# Patient Record
Sex: FEMALE | Race: BLACK OR AFRICAN AMERICAN | NOT HISPANIC OR LATINO
[De-identification: names, ages, dates, MRNs, and addresses within clinical notes are randomized per-mention and may not be internally consistent; named-entity substitution may affect disease eponyms.]

---

## 2017-03-22 ENCOUNTER — TRANSCRIPTION ENCOUNTER (OUTPATIENT)
Age: 32
End: 2017-03-22

## 2019-12-05 ENCOUNTER — APPOINTMENT (OUTPATIENT)
Dept: ANTEPARTUM | Facility: CLINIC | Age: 34
End: 2019-12-05
Payer: MEDICAID

## 2019-12-05 ENCOUNTER — ASOB RESULT (OUTPATIENT)
Age: 34
End: 2019-12-05

## 2019-12-05 PROCEDURE — 76813 OB US NUCHAL MEAS 1 GEST: CPT

## 2019-12-05 PROCEDURE — 36416 COLLJ CAPILLARY BLOOD SPEC: CPT

## 2019-12-05 PROCEDURE — 76801 OB US < 14 WKS SINGLE FETUS: CPT

## 2019-12-26 PROBLEM — Z00.00 ENCOUNTER FOR PREVENTIVE HEALTH EXAMINATION: Status: ACTIVE | Noted: 2019-12-26

## 2019-12-31 ENCOUNTER — APPOINTMENT (OUTPATIENT)
Dept: ANTEPARTUM | Facility: CLINIC | Age: 34
End: 2019-12-31
Payer: MEDICAID

## 2019-12-31 ENCOUNTER — ASOB RESULT (OUTPATIENT)
Age: 34
End: 2019-12-31

## 2019-12-31 PROCEDURE — 99213 OFFICE O/P EST LOW 20 MIN: CPT | Mod: 25

## 2019-12-31 PROCEDURE — 76815 OB US LIMITED FETUS(S): CPT

## 2020-02-03 ENCOUNTER — APPOINTMENT (OUTPATIENT)
Dept: ANTEPARTUM | Facility: CLINIC | Age: 35
End: 2020-02-03
Payer: MEDICAID

## 2020-02-03 ENCOUNTER — ASOB RESULT (OUTPATIENT)
Age: 35
End: 2020-02-03

## 2020-02-03 PROCEDURE — 76817 TRANSVAGINAL US OBSTETRIC: CPT

## 2020-02-03 PROCEDURE — 76811 OB US DETAILED SNGL FETUS: CPT

## 2020-02-11 ENCOUNTER — APPOINTMENT (OUTPATIENT)
Dept: ANTEPARTUM | Facility: CLINIC | Age: 35
End: 2020-02-11
Payer: MEDICAID

## 2020-02-11 ENCOUNTER — ASOB RESULT (OUTPATIENT)
Age: 35
End: 2020-02-11

## 2020-02-11 PROCEDURE — 76816 OB US FOLLOW-UP PER FETUS: CPT

## 2020-02-11 PROCEDURE — 76817 TRANSVAGINAL US OBSTETRIC: CPT

## 2020-02-25 ENCOUNTER — APPOINTMENT (OUTPATIENT)
Dept: ANTEPARTUM | Facility: CLINIC | Age: 35
End: 2020-02-25

## 2020-04-29 ENCOUNTER — ASOB RESULT (OUTPATIENT)
Age: 35
End: 2020-04-29

## 2020-04-29 ENCOUNTER — APPOINTMENT (OUTPATIENT)
Dept: ANTEPARTUM | Facility: CLINIC | Age: 35
End: 2020-04-29
Payer: MEDICAID

## 2020-04-29 PROCEDURE — 76819 FETAL BIOPHYS PROFIL W/O NST: CPT

## 2020-04-29 PROCEDURE — 76816 OB US FOLLOW-UP PER FETUS: CPT

## 2020-05-22 ENCOUNTER — OUTPATIENT (OUTPATIENT)
Dept: OUTPATIENT SERVICES | Facility: HOSPITAL | Age: 35
LOS: 1 days | End: 2020-05-22
Payer: MEDICAID

## 2020-05-22 VITALS
HEIGHT: 63 IN | SYSTOLIC BLOOD PRESSURE: 124 MMHG | DIASTOLIC BLOOD PRESSURE: 80 MMHG | HEART RATE: 98 BPM | OXYGEN SATURATION: 99 % | TEMPERATURE: 97 F | WEIGHT: 175.93 LBS | RESPIRATION RATE: 14 BRPM

## 2020-05-22 DIAGNOSIS — Z98.89 OTHER SPECIFIED POSTPROCEDURAL STATES: Chronic | ICD-10-CM

## 2020-05-22 DIAGNOSIS — Z34.90 ENCOUNTER FOR SUPERVISION OF NORMAL PREGNANCY, UNSPECIFIED, UNSPECIFIED TRIMESTER: ICD-10-CM

## 2020-05-22 DIAGNOSIS — N80.9 ENDOMETRIOSIS, UNSPECIFIED: Chronic | ICD-10-CM

## 2020-05-22 LAB
ANTIBODY ID 1_1: SIGNIFICANT CHANGE UP
ANTIBODY ID 1_2: SIGNIFICANT CHANGE UP
ANTIBODY ID 1_3: SIGNIFICANT CHANGE UP
APPEARANCE UR: SIGNIFICANT CHANGE UP
BACTERIA # UR AUTO: SIGNIFICANT CHANGE UP
BILIRUB UR-MCNC: NEGATIVE — SIGNIFICANT CHANGE UP
BLD GP AB SCN SERPL QL: POSITIVE — SIGNIFICANT CHANGE UP
BLOOD UR QL VISUAL: NEGATIVE — SIGNIFICANT CHANGE UP
COLOR SPEC: YELLOW — SIGNIFICANT CHANGE UP
DAT POLY-SP REAG RBC QL: NEGATIVE — SIGNIFICANT CHANGE UP
GLUCOSE UR-MCNC: NEGATIVE — SIGNIFICANT CHANGE UP
HCT VFR BLD CALC: 29.9 % — LOW (ref 34.5–45)
HGB BLD-MCNC: 9.7 G/DL — LOW (ref 11.5–15.5)
HYALINE CASTS # UR AUTO: NEGATIVE — SIGNIFICANT CHANGE UP
KETONES UR-MCNC: NEGATIVE — SIGNIFICANT CHANGE UP
LEUKOCYTE ESTERASE UR-ACNC: SIGNIFICANT CHANGE UP
MCHC RBC-ENTMCNC: 28.6 PG — SIGNIFICANT CHANGE UP (ref 27–34)
MCHC RBC-ENTMCNC: 32.4 % — SIGNIFICANT CHANGE UP (ref 32–36)
MCV RBC AUTO: 88.2 FL — SIGNIFICANT CHANGE UP (ref 80–100)
MUCOUS THREADS # UR AUTO: SIGNIFICANT CHANGE UP
NITRITE UR-MCNC: NEGATIVE — SIGNIFICANT CHANGE UP
NRBC # FLD: 0 K/UL — SIGNIFICANT CHANGE UP (ref 0–0)
PH UR: 6 — SIGNIFICANT CHANGE UP (ref 5–8)
PLATELET # BLD AUTO: 272 K/UL — SIGNIFICANT CHANGE UP (ref 150–400)
PMV BLD: 9.8 FL — SIGNIFICANT CHANGE UP (ref 7–13)
PROT UR-MCNC: 50 — SIGNIFICANT CHANGE UP
RBC # BLD: 3.39 M/UL — LOW (ref 3.8–5.2)
RBC # FLD: 13.9 % — SIGNIFICANT CHANGE UP (ref 10.3–14.5)
RBC CASTS # UR COMP ASSIST: SIGNIFICANT CHANGE UP (ref 0–?)
RH IG SCN BLD-IMP: POSITIVE — SIGNIFICANT CHANGE UP
SP GR SPEC: 1.03 — SIGNIFICANT CHANGE UP (ref 1–1.04)
SQUAMOUS # UR AUTO: SIGNIFICANT CHANGE UP
UROBILINOGEN FLD QL: SIGNIFICANT CHANGE UP
WBC # BLD: 6.91 K/UL — SIGNIFICANT CHANGE UP (ref 3.8–10.5)
WBC # FLD AUTO: 6.91 K/UL — SIGNIFICANT CHANGE UP (ref 3.8–10.5)
WBC UR QL: SIGNIFICANT CHANGE UP (ref 0–?)

## 2020-05-22 RX ORDER — SODIUM CHLORIDE 9 MG/ML
1000 INJECTION, SOLUTION INTRAVENOUS
Refills: 0 | Status: DISCONTINUED | OUTPATIENT
Start: 2020-05-28 | End: 2020-05-28

## 2020-05-22 NOTE — OB PST NOTE - PROBLEM SELECTOR PLAN 1
Pt scheduled for repeat  section.  labs done results pending.  Hibiclens provided:  verbal and written instructions given with teach back, pt able to verbalize  understanding.  Preop teaching done, pt able to verbalize understanding.

## 2020-05-22 NOTE — OB PST NOTE - NSHPREVIEWOFSYSTEMS_GEN_ALL_CORE
General: No fever, chills, sweating, anorexia, weight loss or weight gain. No polyphagia, polyurea, polydypsia, malaise, or fatigue    Skin: No rashes, itching, or dryness. No change in size/color of moles. No tumors, brittle nails, pitted nails, or hair loss    Breast: No tenderness, lumps, or nipple discharge      Ophthalmologic: No diplopia, photophobia, lacrimation, blurred Vision , or eye discharge    ENMT Symptoms: No hearing difficulty, ear pain, tinnitus, or vertigo. No sinus symptoms, nasal congestion, nasal   discharge, or nasal obstruction    Respiratory and Thorax: No wheezing, dyspnea, cough, hemoptysis, or pleuritic chest pPain     Cardiovascular: h/o pre eclampsia with first baby 2006 was placed on asa, stopped on own one month ago.  No chest pain, palpitations, dyspnea on exertion, orthopnea, paroxysmal nocturnal dyspnea,   peripheral edema, or claudication    Gastrointestinal: No nausea, vomiting, diarrhea, constipation, change in bowel habits, flatulence, abdominal pain, or melena    Genitourinary/ Pelvis: No hematuria, renal colic, or flank pain.  No urine discoloration, incontinence, dysuria, or urinary hesitancy. Normal urinary frequency. No nocturia, abnormal vaginal bleeding, vaginal discharge, spotting, pelvic pain, or vaginal leakage    Musculoskeletal: No arthralgia, arthritis, joint swelling, muscle cramping, muscle weakness, neck pain, arm pain, or leg pain    Neurological: No transient paralysis, weakness, paresthesias, or seizures. No syncope, tremors, vertigo, loss of sensation, difficulty walking, loss of consciousness, hemiparesis, confusion, or facial palsy    Psychiatric: No suicidal ideation, depression, anxiety, insomnia, memory loss, paranoia, mood swings, agitation, hallucinations, or hyperactivity    Hematology: No gum bleeding, nose bleeding, or skin lumps    Lymphatic: No enlarged or tender lymph nodes. No extremity swelling    Endocrine: No heat or cold intolerance    Immunologic: No recurrent or persistent infections

## 2020-05-23 LAB — T PALLIDUM AB TITR SER: NEGATIVE — SIGNIFICANT CHANGE UP

## 2020-05-23 PROCEDURE — 86077 PHYS BLOOD BANK SERV XMATCH: CPT

## 2020-05-26 DIAGNOSIS — Z01.818 ENCOUNTER FOR OTHER PREPROCEDURAL EXAMINATION: ICD-10-CM

## 2020-05-27 ENCOUNTER — TRANSCRIPTION ENCOUNTER (OUTPATIENT)
Age: 35
End: 2020-05-27

## 2020-05-27 ENCOUNTER — APPOINTMENT (OUTPATIENT)
Dept: DISASTER EMERGENCY | Facility: CLINIC | Age: 35
End: 2020-05-27

## 2020-05-27 RX ORDER — FAMOTIDINE 10 MG/ML
20 INJECTION INTRAVENOUS ONCE
Refills: 0 | Status: DISCONTINUED | OUTPATIENT
Start: 2020-05-28 | End: 2020-05-31

## 2020-05-28 ENCOUNTER — INPATIENT (INPATIENT)
Facility: HOSPITAL | Age: 35
LOS: 2 days | Discharge: HOME | End: 2020-05-31
Attending: OBSTETRICS & GYNECOLOGY | Admitting: OBSTETRICS & GYNECOLOGY
Payer: MEDICAID

## 2020-05-28 ENCOUNTER — RESULT REVIEW (OUTPATIENT)
Age: 35
End: 2020-05-28

## 2020-05-28 VITALS
RESPIRATION RATE: 9 BRPM | SYSTOLIC BLOOD PRESSURE: 122 MMHG | HEART RATE: 93 BPM | DIASTOLIC BLOOD PRESSURE: 95 MMHG | OXYGEN SATURATION: 97 %

## 2020-05-28 DIAGNOSIS — N80.9 ENDOMETRIOSIS, UNSPECIFIED: Chronic | ICD-10-CM

## 2020-05-28 DIAGNOSIS — Z98.89 OTHER SPECIFIED POSTPROCEDURAL STATES: Chronic | ICD-10-CM

## 2020-05-28 LAB
ANTIBODY ID 1_1: SIGNIFICANT CHANGE UP
ANTIBODY ID 1_2: SIGNIFICANT CHANGE UP
ANTIBODY ID 1_3: SIGNIFICANT CHANGE UP
BLD GP AB SCN SERPL QL: POSITIVE — SIGNIFICANT CHANGE UP
DAT POLY-SP REAG RBC QL: NEGATIVE — SIGNIFICANT CHANGE UP
HCT VFR BLD CALC: 30.7 % — LOW (ref 34.5–45)
HGB BLD-MCNC: 9.8 G/DL — LOW (ref 11.5–15.5)
MCHC RBC-ENTMCNC: 28.2 PG — SIGNIFICANT CHANGE UP (ref 27–34)
MCHC RBC-ENTMCNC: 31.9 % — LOW (ref 32–36)
MCV RBC AUTO: 88.5 FL — SIGNIFICANT CHANGE UP (ref 80–100)
NRBC # FLD: 0 K/UL — SIGNIFICANT CHANGE UP (ref 0–0)
PLATELET # BLD AUTO: 279 K/UL — SIGNIFICANT CHANGE UP (ref 150–400)
PMV BLD: 9.6 FL — SIGNIFICANT CHANGE UP (ref 7–13)
RBC # BLD: 3.47 M/UL — LOW (ref 3.8–5.2)
RBC # FLD: 13.8 % — SIGNIFICANT CHANGE UP (ref 10.3–14.5)
RH IG SCN BLD-IMP: POSITIVE — SIGNIFICANT CHANGE UP
WBC # BLD: 7.08 K/UL — SIGNIFICANT CHANGE UP (ref 3.8–10.5)
WBC # FLD AUTO: 7.08 K/UL — SIGNIFICANT CHANGE UP (ref 3.8–10.5)

## 2020-05-28 PROCEDURE — 88307 TISSUE EXAM BY PATHOLOGIST: CPT | Mod: 26

## 2020-05-28 PROCEDURE — 44125 REMOVAL OF SMALL INTESTINE: CPT

## 2020-05-28 PROCEDURE — 86077 PHYS BLOOD BANK SERV XMATCH: CPT

## 2020-05-28 RX ORDER — BUTORPHANOL TARTRATE 2 MG/ML
0.12 INJECTION, SOLUTION INTRAMUSCULAR; INTRAVENOUS EVERY 6 HOURS
Refills: 0 | Status: DISCONTINUED | OUTPATIENT
Start: 2020-05-28 | End: 2020-05-30

## 2020-05-28 RX ORDER — CITRIC ACID/SODIUM CITRATE 300-500 MG
30 SOLUTION, ORAL ORAL ONCE
Refills: 0 | Status: COMPLETED | OUTPATIENT
Start: 2020-05-28 | End: 2020-05-28

## 2020-05-28 RX ORDER — HEPARIN SODIUM 5000 [USP'U]/ML
5000 INJECTION INTRAVENOUS; SUBCUTANEOUS EVERY 12 HOURS
Refills: 0 | Status: DISCONTINUED | OUTPATIENT
Start: 2020-05-28 | End: 2020-05-31

## 2020-05-28 RX ORDER — ONDANSETRON 8 MG/1
4 TABLET, FILM COATED ORAL EVERY 6 HOURS
Refills: 0 | Status: DISCONTINUED | OUTPATIENT
Start: 2020-05-28 | End: 2020-05-30

## 2020-05-28 RX ORDER — SODIUM CHLORIDE 9 MG/ML
1000 INJECTION, SOLUTION INTRAVENOUS
Refills: 0 | Status: DISCONTINUED | OUTPATIENT
Start: 2020-05-28 | End: 2020-05-28

## 2020-05-28 RX ORDER — OXYCODONE HYDROCHLORIDE 5 MG/1
5 TABLET ORAL ONCE
Refills: 0 | Status: DISCONTINUED | OUTPATIENT
Start: 2020-05-28 | End: 2020-05-31

## 2020-05-28 RX ORDER — TETANUS TOXOID, REDUCED DIPHTHERIA TOXOID AND ACELLULAR PERTUSSIS VACCINE, ADSORBED 5; 2.5; 8; 8; 2.5 [IU]/.5ML; [IU]/.5ML; UG/.5ML; UG/.5ML; UG/.5ML
0.5 SUSPENSION INTRAMUSCULAR ONCE
Refills: 0 | Status: DISCONTINUED | OUTPATIENT
Start: 2020-05-28 | End: 2020-05-31

## 2020-05-28 RX ORDER — ONDANSETRON 8 MG/1
4 TABLET, FILM COATED ORAL ONCE
Refills: 0 | Status: DISCONTINUED | OUTPATIENT
Start: 2020-05-28 | End: 2020-05-28

## 2020-05-28 RX ORDER — DIPHENHYDRAMINE HCL 50 MG
25 CAPSULE ORAL EVERY 6 HOURS
Refills: 0 | Status: DISCONTINUED | OUTPATIENT
Start: 2020-05-28 | End: 2020-05-31

## 2020-05-28 RX ORDER — CEFOTETAN DISODIUM 1 G
2 VIAL (EA) INJECTION ONCE
Refills: 0 | Status: DISCONTINUED | OUTPATIENT
Start: 2020-05-28 | End: 2020-05-29

## 2020-05-28 RX ORDER — OXYCODONE HYDROCHLORIDE 5 MG/1
5 TABLET ORAL
Refills: 0 | Status: COMPLETED | OUTPATIENT
Start: 2020-05-28 | End: 2020-06-04

## 2020-05-28 RX ORDER — INFLUENZA VIRUS VACCINE 15; 15; 15; 15 UG/.5ML; UG/.5ML; UG/.5ML; UG/.5ML
0.5 SUSPENSION INTRAMUSCULAR ONCE
Refills: 0 | Status: COMPLETED | OUTPATIENT
Start: 2020-05-28 | End: 2020-05-28

## 2020-05-28 RX ORDER — NALOXONE HYDROCHLORIDE 4 MG/.1ML
0.1 SPRAY NASAL
Refills: 0 | Status: DISCONTINUED | OUTPATIENT
Start: 2020-05-28 | End: 2020-05-30

## 2020-05-28 RX ORDER — ACETAMINOPHEN 500 MG
975 TABLET ORAL
Refills: 0 | Status: DISCONTINUED | OUTPATIENT
Start: 2020-05-28 | End: 2020-05-31

## 2020-05-28 RX ORDER — SIMETHICONE 80 MG/1
80 TABLET, CHEWABLE ORAL EVERY 4 HOURS
Refills: 0 | Status: DISCONTINUED | OUTPATIENT
Start: 2020-05-28 | End: 2020-05-31

## 2020-05-28 RX ORDER — MAGNESIUM HYDROXIDE 400 MG/1
30 TABLET, CHEWABLE ORAL
Refills: 0 | Status: DISCONTINUED | OUTPATIENT
Start: 2020-05-28 | End: 2020-05-31

## 2020-05-28 RX ORDER — METOCLOPRAMIDE HCL 10 MG
10 TABLET ORAL ONCE
Refills: 0 | Status: COMPLETED | OUTPATIENT
Start: 2020-05-28 | End: 2020-05-28

## 2020-05-28 RX ORDER — LANOLIN
1 OINTMENT (GRAM) TOPICAL EVERY 6 HOURS
Refills: 0 | Status: DISCONTINUED | OUTPATIENT
Start: 2020-05-28 | End: 2020-05-31

## 2020-05-28 RX ORDER — OXYTOCIN 10 UNIT/ML
333.33 VIAL (ML) INJECTION
Qty: 20 | Refills: 0 | Status: DISCONTINUED | OUTPATIENT
Start: 2020-05-28 | End: 2020-05-28

## 2020-05-28 RX ORDER — SODIUM CHLORIDE 9 MG/ML
1000 INJECTION, SOLUTION INTRAVENOUS ONCE
Refills: 0 | Status: COMPLETED | OUTPATIENT
Start: 2020-05-28 | End: 2020-05-28

## 2020-05-28 RX ADMIN — Medication 30 MILLILITER(S): at 10:58

## 2020-05-28 RX ADMIN — HEPARIN SODIUM 5000 UNIT(S): 5000 INJECTION INTRAVENOUS; SUBCUTANEOUS at 22:00

## 2020-05-28 RX ADMIN — Medication 10 MILLIGRAM(S): at 11:54

## 2020-05-28 RX ADMIN — SODIUM CHLORIDE 2000 MILLILITER(S): 9 INJECTION, SOLUTION INTRAVENOUS at 10:59

## 2020-05-28 NOTE — BRIEF OPERATIVE NOTE - NSICDXBRIEFPROCEDURE_GEN_ALL_CORE_FT
PROCEDURES:  Single enterectomy 29-May-2020 06:59:58  Rigoberto Maddox
PROCEDURES:  Colectomy, ascending colon 28-May-2020 18:38:38 partial with side-to-side reanastomosis Kasie Lees  Repeat  section 28-May-2020 18:31:26  Kasie Lees

## 2020-05-28 NOTE — BRIEF OPERATIVE NOTE - NSICDXBRIEFPREOP_GEN_ALL_CORE_FT
PRE-OP DIAGNOSIS:  History of classical  section 28-May-2020 18:32:33  Kasie Lees
PRE-OP DIAGNOSIS:  History of classical  section 28-May-2020 18:32:33  Kasie Lees

## 2020-05-28 NOTE — BRIEF OPERATIVE NOTE - OPERATION/FINDINGS
Dense pelvic adhesions with some evidence of deserosalation. Primary repair narrowed the lumen >50%, resected a ~7cm single section of ileum (20-30cm from IC valve) and did a side to side functional end to end stapled anastomosis.
Dense midline adhesions involving anterior abdominal wall, rectus muscle, and anterior uterus with an adhered loop of ascending colon, just distal to cecum.  Bowel fibrotic after resection.  Adnexa not visualized 2/2 adhesions.    Female infant, cephalic presentation, APGARs 9, 9, 3260g

## 2020-05-28 NOTE — OB NEONATOLOGY/PEDIATRICIAN DELIVERY SUMMARY - NSPEDSNEONOTESA_OBGYN_ALL_OB_FT
37 abd 0 week F born via repeat C/S to a [id number] mother. Prior 28 week C/S with classical incision. Mother positive for anti-PETER antibodies. NRNL. B+, GBS neg 5/6, PNL neg/I, RPR sent and pending. BF, hep  B, Trang. 9,9. NBN.

## 2020-05-28 NOTE — OB RN INTRAOPERATIVE NOTE - NS_DRESSSECURWITH_OBGYN_ALL_OB
How Severe Is Your Skin Lesion?: mild Have Your Skin Lesions Been Treated?: not been treated Is This A New Presentation, Or A Follow-Up?: Skin Lesions Transpore Tape

## 2020-05-28 NOTE — OB PROVIDER DELIVERY SUMMARY - NSPROVIDERDELIVERYNOTE_OBGYN_ALL_OB_FT
Dense midline adhesions involving anterior abdominal wall, rectus muscle, and anterior uterus with an adhered loop of ascending colon, just distal to cecum.  Bowel fibrotic after resection.  Adnexa not visualized 2/2 adhesions.    Female infant, cephalic presentation, APGARs 9, 9, 3260g

## 2020-05-28 NOTE — CHART NOTE - NSCHARTNOTEFT_GEN_A_CORE
POST-OPERATIVE NOTE    Subjective:  Patient is s/p  section with intra-op consult for lysis of adhesions and SBR of distal ilium with primary stapled anastomosis. Recovering appropriately.     Vital Signs Last 24 Hrs  T(C): 36.9 (28 May 2020 18:04), Max: 36.9 (28 May 2020 18:04)  T(F): 98.4 (28 May 2020 18:04), Max: 98.4 (28 May 2020 18:04)  HR: 73 (28 May 2020 19:30) (73 - 93)  BP: 119/68 (28 May 2020 19:30) (117/90 - 141/104)  BP(mean): 81 (28 May 2020 19:30) (81 - 113)  RR: 14 (28 May 2020 19:30) (9 - 21)  SpO2: 98% (28 May 2020 19:30) (95% - 100%)  I&O's Detail    28 May 2020 07:01  -  28 May 2020 20:30  --------------------------------------------------------  IN:    lactated ringers.: 260 mL    Other: 4000 mL    oxytocin Infusion: 437 mL  Total IN: 4697 mL    OUT:    Estimated Blood Loss: 772 mL    Indwelling Catheter - Urethral: 820 mL  Total OUT: 1592 mL    Total NET: 3105 mL        cefoTEtan  IVPB 2  cefoTEtan  IVPB 2  heparin   Injectable 5000    PAST MEDICAL & SURGICAL HISTORY:  Pregnancy  Seasonal allergies  Endometriosis determined by laparoscopy: 3/15/2015  H/O  section: 2016        Physical Exam:  General: NAD, resting comfortably in bed  Pulmonary: Nonlabored breathing, no respiratory distress  Cardiovascular: NSR, no murmurs or rubs  Abdominal: soft, appropriately tender, nondistended. Pfannensteil incision with dressing in place, c/d/i  Extremities: [facility]      LABS:                        9.8    7.08  )-----------( 279      ( 28 May 2020 09:53 )             30.7             CAPILLARY BLOOD GLUCOSE          Radiology and Additional Studies:    Assessment:  The patient is a 34y Female who is now several hours post-op from a  section, partial small bowel resection and anastomosis.    Plan:  - Pain control as needed  - DVT ppx  - OOB and ambulating as tolerated  - No surgical contraindications to advancing diet  - F/u AM labs  - Remainder of care per primary team    B Team Surgery  [id number]

## 2020-05-28 NOTE — BRIEF OPERATIVE NOTE - NSICDXBRIEFPOSTOP_GEN_ALL_CORE_FT
POST-OP DIAGNOSIS:  Pelvic adhesions 28-May-2020 18:34:26  Kasie Lees  History of classical  section 28-May-2020 18:33:20  Kasie Lees

## 2020-05-28 NOTE — OB PROVIDER H&P - ASSESSMENT
35 yo  at 37 wks for scheduled repeat  section w/ Dr. Elias  -admit to labor and delivery  -routine admission labs  -covid labs for patient and partner pending  -NPO  -NST reactive  -pepcid, zofran, bicitra for nausea/reflux  -anesthesia consulted  -scheduled section for 11 am    Nancy Stanton PGY1

## 2020-05-28 NOTE — OB PROVIDER H&P - HISTORY OF PRESENT ILLNESS
35 yo  at 37 wks presenting for scheduled r  delivery w/ Dr. Elias.  PNC uncomplicated. Denies regular ctx, LOF, or VB. Reports good fetal movement. EFW 2900. GBS-.    ObHx: prior classical at 28wks for sPEC baby 1#15 2006  MAB x2 ,  expectantly managed  TOP x2 ,  D&C x2  GynHx: hx endometriosis w/ endometrioma removal on L in   PMH: none  PSH: D&C x2, endometrioma removal  All: NKDA  Psych: denies anxiety, depression  Social: denies tobacco, alcohol, recreational drug use

## 2020-05-29 LAB
BASOPHILS # BLD AUTO: 0.02 K/UL — SIGNIFICANT CHANGE UP (ref 0–0.2)
BASOPHILS NFR BLD AUTO: 0.1 % — SIGNIFICANT CHANGE UP (ref 0–2)
EOSINOPHIL # BLD AUTO: 0 K/UL — SIGNIFICANT CHANGE UP (ref 0–0.5)
EOSINOPHIL NFR BLD AUTO: 0 % — SIGNIFICANT CHANGE UP (ref 0–6)
HCT VFR BLD CALC: 23.6 % — LOW (ref 34.5–45)
HGB BLD-MCNC: 7.6 G/DL — LOW (ref 11.5–15.5)
IMM GRANULOCYTES NFR BLD AUTO: 0.6 % — SIGNIFICANT CHANGE UP (ref 0–1.5)
LYMPHOCYTES # BLD AUTO: 15.4 % — SIGNIFICANT CHANGE UP (ref 13–44)
LYMPHOCYTES # BLD AUTO: 2.15 K/UL — SIGNIFICANT CHANGE UP (ref 1–3.3)
MCHC RBC-ENTMCNC: 28.3 PG — SIGNIFICANT CHANGE UP (ref 27–34)
MCHC RBC-ENTMCNC: 32.2 % — SIGNIFICANT CHANGE UP (ref 32–36)
MCV RBC AUTO: 87.7 FL — SIGNIFICANT CHANGE UP (ref 80–100)
MONOCYTES # BLD AUTO: 1.31 K/UL — HIGH (ref 0–0.9)
MONOCYTES NFR BLD AUTO: 9.4 % — SIGNIFICANT CHANGE UP (ref 2–14)
NEUTROPHILS # BLD AUTO: 10.36 K/UL — HIGH (ref 1.8–7.4)
NEUTROPHILS NFR BLD AUTO: 74.5 % — SIGNIFICANT CHANGE UP (ref 43–77)
NRBC # FLD: 0 K/UL — SIGNIFICANT CHANGE UP (ref 0–0)
PLATELET # BLD AUTO: 269 K/UL — SIGNIFICANT CHANGE UP (ref 150–400)
PMV BLD: 10 FL — SIGNIFICANT CHANGE UP (ref 7–13)
RBC # BLD: 2.69 M/UL — LOW (ref 3.8–5.2)
RBC # FLD: 13.8 % — SIGNIFICANT CHANGE UP (ref 10.3–14.5)
SARS-COV-2 N GENE NPH QL NAA+PROBE: NOT DETECTED
T PALLIDUM AB TITR SER: NEGATIVE — SIGNIFICANT CHANGE UP
WBC # BLD: 13.93 K/UL — HIGH (ref 3.8–10.5)
WBC # FLD AUTO: 13.93 K/UL — HIGH (ref 3.8–10.5)

## 2020-05-29 RX ORDER — OXYCODONE HYDROCHLORIDE 5 MG/1
5 TABLET ORAL
Refills: 0 | Status: DISCONTINUED | OUTPATIENT
Start: 2020-05-29 | End: 2020-05-31

## 2020-05-29 RX ORDER — KETOROLAC TROMETHAMINE 30 MG/ML
30 SYRINGE (ML) INJECTION EVERY 6 HOURS
Refills: 0 | Status: DISCONTINUED | OUTPATIENT
Start: 2020-05-29 | End: 2020-05-29

## 2020-05-29 RX ORDER — ASCORBIC ACID 60 MG
500 TABLET,CHEWABLE ORAL DAILY
Refills: 0 | Status: DISCONTINUED | OUTPATIENT
Start: 2020-05-29 | End: 2020-05-31

## 2020-05-29 RX ORDER — SENNA PLUS 8.6 MG/1
1 TABLET ORAL
Refills: 0 | Status: DISCONTINUED | OUTPATIENT
Start: 2020-05-29 | End: 2020-05-31

## 2020-05-29 RX ORDER — FERROUS SULFATE 325(65) MG
325 TABLET ORAL THREE TIMES A DAY
Refills: 0 | Status: DISCONTINUED | OUTPATIENT
Start: 2020-05-29 | End: 2020-05-31

## 2020-05-29 RX ADMIN — Medication 500 MILLIGRAM(S): at 11:35

## 2020-05-29 RX ADMIN — HEPARIN SODIUM 5000 UNIT(S): 5000 INJECTION INTRAVENOUS; SUBCUTANEOUS at 11:35

## 2020-05-29 RX ADMIN — Medication 325 MILLIGRAM(S): at 22:03

## 2020-05-29 RX ADMIN — Medication 975 MILLIGRAM(S): at 03:06

## 2020-05-29 RX ADMIN — Medication 30 MILLIGRAM(S): at 03:04

## 2020-05-29 RX ADMIN — Medication 975 MILLIGRAM(S): at 23:56

## 2020-05-29 RX ADMIN — SIMETHICONE 80 MILLIGRAM(S): 80 TABLET, CHEWABLE ORAL at 22:03

## 2020-05-29 RX ADMIN — Medication 975 MILLIGRAM(S): at 17:43

## 2020-05-29 RX ADMIN — MAGNESIUM HYDROXIDE 30 MILLILITER(S): 400 TABLET, CHEWABLE ORAL at 17:48

## 2020-05-29 RX ADMIN — Medication 30 MILLIGRAM(S): at 03:06

## 2020-05-29 RX ADMIN — Medication 975 MILLIGRAM(S): at 11:35

## 2020-05-29 RX ADMIN — HEPARIN SODIUM 5000 UNIT(S): 5000 INJECTION INTRAVENOUS; SUBCUTANEOUS at 23:55

## 2020-05-29 RX ADMIN — OXYCODONE HYDROCHLORIDE 5 MILLIGRAM(S): 5 TABLET ORAL at 22:01

## 2020-05-29 RX ADMIN — Medication 325 MILLIGRAM(S): at 11:35

## 2020-05-29 NOTE — PROGRESS NOTE ADULT - SUBJECTIVE AND OBJECTIVE BOX
Pain Service Follow-up  Postop Day  1    S/P  C- Section    T(C): 36.7 (05-29-20 @ 06:59), Max: 36.9 (05-28-20 @ 18:04)  HR: 79 (05-29-20 @ 06:59) (73 - 93)  BP: 119/87 (05-29-20 @ 06:59) (117/76 - 141/104)  RR: 16 (05-29-20 @ 06:59) (9 - 21)  SpO2: 100% (05-29-20 @ 06:59) (95% - 100%)  Wt(kg): --      THERAPY:  Spinal Morphine     Sedation Score:	  [X] Alert	      [  ] Drowsy       [  ] Arousable	[  ] Asleep         [  ] Unresponsive    Side Effects:	  [X] None	      [  ] Nausea       [  ] Pruritus        [  ] Weakness   [  ] Numbness        ASSESSMENT/ PLAN   [ X ] Discontinue         [  ] Continue    [ X ] Documentation and Verification of current medications       Satisfactory Post Anesthetic Course

## 2020-05-29 NOTE — PROGRESS NOTE ADULT - PROBLEM SELECTOR PLAN 1
- Per surgery team, can advance diet as tolerated. Patient to start a regular diet today.  - Increase ambulation.  - Continue motrin, tylenol, oxycodone PRN for pain control.    - Patient asymptomatic with no evidence of ongoing bleeding.    Shy Elizalde, PGY-1

## 2020-05-29 NOTE — PROGRESS NOTE ADULT - SUBJECTIVE AND OBJECTIVE BOX
Morning Surgical Progress Note  Patient is a 34y old  Female who presents with a chief complaint of     SUBJECTIVE: Patient seen and examined at bedside with surgical team, patient without complaints. There were no acute events overnight.     Vital Signs Last 24 Hrs  T(C): 36.7 (29 May 2020 06:59), Max: 36.9 (28 May 2020 18:04)  T(F): 98 (29 May 2020 06:59), Max: 98.4 (28 May 2020 18:04)  HR: 79 (29 May 2020 06:59) (73 - 93)  BP: 119/87 (29 May 2020 06:59) (117/76 - 141/104)  BP(mean): 97 (28 May 2020 20:30) (81 - 113)  RR: 16 (29 May 2020 06:59) (9 - 21)  SpO2: 100% (29 May 2020 06:59) (95% - 100%)I&O's Detail    28 May 2020 07:01  -  29 May 2020 07:00  --------------------------------------------------------  IN:    lactated ringers.: 260 mL    Other: 4000 mL    oxytocin Infusion: 437 mL  Total IN: 4697 mL    OUT:    Estimated Blood Loss: 772 mL    Indwelling Catheter - Urethral: 520 mL  Total OUT: 1292 mL    Total NET: 3405 mL      MEDICATIONS  (STANDING):  acetaminophen   Tablet .. 975 milliGRAM(s) Oral <User Schedule>  cefoTEtan  IVPB 2 Gram(s) IV Intermittent once  diphtheria/tetanus/pertussis (acellular) Vaccine (ADAcel) 0.5 milliLiter(s) IntraMuscular once  famotidine Injectable 20 milliGRAM(s) IV Push once  heparin   Injectable 5000 Unit(s) SubCutaneous every 12 hours  ketorolac   Injectable 30 milliGRAM(s) IV Push every 6 hours    MEDICATIONS  (PRN):  butorphanol Injectable 0.125 milliGRAM(s) IV Push every 6 hours PRN Pruritus  diphenhydrAMINE 25 milliGRAM(s) Oral every 6 hours PRN Itching  lanolin Ointment 1 Application(s) Topical every 6 hours PRN Sore Nipples  magnesium hydroxide Suspension 30 milliLiter(s) Oral two times a day PRN Constipation  naloxone Injectable 0.1 milliGRAM(s) IV Push every 3 minutes PRN For ANY of the following changes in patient status:  A. RR LESS THAN 10 breaths per minute, B. Oxygen saturation LESS THAN 90%, C. Sedation score of 6  ondansetron Injectable 4 milliGRAM(s) IV Push every 6 hours PRN Nausea  oxyCODONE    IR 5 milliGRAM(s) Oral every 3 hours PRN Moderate to Severe Pain (4-10)  oxyCODONE    IR 5 milliGRAM(s) Oral once PRN Moderate to Severe Pain (4-10)  simethicone 80 milliGRAM(s) Chew every 4 hours PRN Gas      Physical Exam  Constitutional: A&Ox3, NAD  Respiratory: CTA b/l  Cardiac: RRR, S1 and S2, no m/r/g  Gastrointestinal: abdomen soft, ND, appropriately tender to palpation; Pfannensteil incision with dressing in place, c/d/i      LABS:                        9.8    7.08  )-----------( 279      ( 28 May 2020 09:53 )             30.7                 ABO Interpretation: B (05-28-20 @ 09:46) Morning Surgical Progress Note  Patient is a 34y old  Female who presents with a chief complaint of     SUBJECTIVE: Patient seen and examined at bedside with surgical team, patient without complaints. There were no acute events overnight. She is tolerating clears and denies n/v.    Vital Signs Last 24 Hrs  T(C): 36.7 (29 May 2020 06:59), Max: 36.9 (28 May 2020 18:04)  T(F): 98 (29 May 2020 06:59), Max: 98.4 (28 May 2020 18:04)  HR: 79 (29 May 2020 06:59) (73 - 93)  BP: 119/87 (29 May 2020 06:59) (117/76 - 141/104)  BP(mean): 97 (28 May 2020 20:30) (81 - 113)  RR: 16 (29 May 2020 06:59) (9 - 21)  SpO2: 100% (29 May 2020 06:59) (95% - 100%)I&O's Detail    28 May 2020 07:01  -  29 May 2020 07:00  --------------------------------------------------------  IN:    lactated ringers.: 260 mL    Other: 4000 mL    oxytocin Infusion: 437 mL  Total IN: 4697 mL    OUT:    Estimated Blood Loss: 772 mL    Indwelling Catheter - Urethral: 520 mL  Total OUT: 1292 mL    Total NET: 3405 mL      MEDICATIONS  (STANDING):  acetaminophen   Tablet .. 975 milliGRAM(s) Oral <User Schedule>  cefoTEtan  IVPB 2 Gram(s) IV Intermittent once  diphtheria/tetanus/pertussis (acellular) Vaccine (ADAcel) 0.5 milliLiter(s) IntraMuscular once  famotidine Injectable 20 milliGRAM(s) IV Push once  heparin   Injectable 5000 Unit(s) SubCutaneous every 12 hours  ketorolac   Injectable 30 milliGRAM(s) IV Push every 6 hours    MEDICATIONS  (PRN):  butorphanol Injectable 0.125 milliGRAM(s) IV Push every 6 hours PRN Pruritus  diphenhydrAMINE 25 milliGRAM(s) Oral every 6 hours PRN Itching  lanolin Ointment 1 Application(s) Topical every 6 hours PRN Sore Nipples  magnesium hydroxide Suspension 30 milliLiter(s) Oral two times a day PRN Constipation  naloxone Injectable 0.1 milliGRAM(s) IV Push every 3 minutes PRN For ANY of the following changes in patient status:  A. RR LESS THAN 10 breaths per minute, B. Oxygen saturation LESS THAN 90%, C. Sedation score of 6  ondansetron Injectable 4 milliGRAM(s) IV Push every 6 hours PRN Nausea  oxyCODONE    IR 5 milliGRAM(s) Oral every 3 hours PRN Moderate to Severe Pain (4-10)  oxyCODONE    IR 5 milliGRAM(s) Oral once PRN Moderate to Severe Pain (4-10)  simethicone 80 milliGRAM(s) Chew every 4 hours PRN Gas      Physical Exam  Constitutional: A&Ox3, NAD  Respiratory: CTA b/l  Cardiac: RRR, S1 and S2, no m/r/g  Gastrointestinal: abdomen soft, ND, appropriately tender to palpation; Pfannensteil incision with dressing in place, c/d/i      LABS:                        9.8    7.08  )-----------( 279      ( 28 May 2020 09:53 )             30.7                 ABO Interpretation: B (05-28-20 @ 09:46)

## 2020-05-29 NOTE — PROGRESS NOTE ADULT - ASSESSMENT
33yo F s/p  section with intra-op consult for lysis of adhesions and SBR of distal ilium with primary stapled anastomosis . Recovering appropriately.     Plan:  Advance diet as tolerated  Pain Control  Care per primary team  Follow up am labs  Monitor Is&Os    B Team  [id number] 35yo F s/p  section with intra-op consult for lysis of adhesions and SBR of distal ilium with primary stapled anastomosis . Recovering appropriately.     Plan:  Advance diet as tolerated  Pain Control  Follow up am labs  Monitor Is&Os  Do not discharge today  Care per primary team    B Team  [id number]

## 2020-05-29 NOTE — PROGRESS NOTE ADULT - ASSESSMENT
A/P: 33yo POD#1 s/p rLTCS c/b bowel injury s/p small bowel resection of distal ileum with primary stapled anastomosis. Patient is stable and doing well post-operatively.

## 2020-05-29 NOTE — PROGRESS NOTE ADULT - ATTENDING COMMENTS
I saw and examined the patient. I was physically present for the key portions of the evaluation and management (E/M) service provided.  I agree with the above history, physical, and plan which I have reviewed and edited where appropriate.    Regular diet  Pain control  OOB/ambulation encouraged   If she has a fever greater than 100.5 or experiences pain worse than her expected  discomfort, please call and/or assess for possible enteric leak.     Follow up with me in 1-2 weeks.     Rigoberto Maddox MD  Acute and Critical Care Surgery    The Acute Care Surgery (B Team) Attending Group Practice:  Dr. Arsen Peres, Dr. Amor Zuleta, Dr. Rigoberto Maddox, and Dr. Eleuterio Sánchez    Urgent issues - spectra [phone] or [phone]  Nonurgent issues - [phone]  Patient appointments or after hours - [phone]

## 2020-05-29 NOTE — PROGRESS NOTE ADULT - SUBJECTIVE AND OBJECTIVE BOX
33 y/o  s/p repeat  POD#1  complicated by dense adhesions on anterior uterus including bowel  converted from spinal anesthesia to general  surgical intraop consult- lysis of adhesions and resection portion of small bowel  s/p post op dose of cefotetan    pt denies cheat pain, shortness of breath overnight  denies nausea/vomiting, tolerating regular diet  ambulating, voiding freely  no flatus or bowel movement yet  incisional pain controlled  reports minimal lochia    vitals  /65 P94 RR18 T98.6  Cardio Ns1s2  Lungs CTAB  abdomen- appropriate distention, pos Bowel sound wnl  	incision clean/dry/intact. mild tenderness  Lext +2edema bilat/ non tender    labs  wbc 13, h/h 7.6/23 (from9.8/30) ptl 269    A/P  33 y/o  s/p repeat  complicated by dense adhesions resulting in  resection of portion of small bowel, acute blood loss anemia- stable    cont post partum/post op care  cont PO pain mgnt  cont regular diet  cont heparin for DVT prophylaxis   ambulation encouraged  surgical f/up note appreciated

## 2020-05-29 NOTE — PROGRESS NOTE ADULT - SUBJECTIVE AND OBJECTIVE BOX
OB Progress Note:  Delivery, POD#1    S: 35yo POD#1 s/p rLTCS c/b bowel injury s/p small bowel resection of distal ileum with primary stapled anastomosis. Her pain is well controlled. She is tolerating a clear liquid diet and desires to eat regular food. Denies N/V. Denies CP/SOB/lightheadedness/dizziness. Endorses light vaginal bleeding, less than one pad per hour. She is ambulating without difficulty. Voiding spontaneously.     O:   Vital Signs Last 24 Hrs  T(C): 36.7 (29 May 2020 06:59), Max: 36.9 (28 May 2020 18:04)  T(F): 98 (29 May 2020 06:59), Max: 98.4 (28 May 2020 18:04)  HR: 79 (29 May 2020 06:59) (73 - 93)  BP: 119/87 (29 May 2020 06:59) (117/76 - 141/104)  BP(mean): 97 (28 May 2020 20:30) (81 - 113)  RR: 16 (29 May 2020 06:59) (9 - 21)  SpO2: 100% (29 May 2020 06:59) (95% - 100%)    PE:  General: NAD  Heart: extremities well-perfused  Lungs: breathing comfortably  Abdomen: Mildly distended, appropriately tender, fundus firm, incision c/d/i  Extremities: No erythema, no pitting edema    Labs:  Blood type: B Positive  Rubella IgG: RPR: Negative                          7.6<L>   13.93<H> >-----------< 269    (  @ 06:40 )             23.6<L>                        9.8<L>   7.08 >-----------< 279    (  @ 09:53 )             30.7<L>

## 2020-05-29 NOTE — LACTATION INITIAL EVALUATION - NS LACT CON REASON FOR REQ
reviewed  late    behavio with  mother  . discussed  signs  of  effective  feeding and  swallowing.  instructed  to offer both  breast at a feeding ,feed on cue and safe  skin to skin.  . reviewed  late    behavior  and  discussed  strategies  to wake  nbn  . reviewed  the  log a nd   discussed  importance  of  waking  nbn for   8 feedings  or  more in 24  hours  .  offered assistance as needed./general questions without assessment/multiparous mom

## 2020-05-30 LAB
BASOPHILS # BLD AUTO: 0.01 K/UL — SIGNIFICANT CHANGE UP (ref 0–0.2)
BASOPHILS NFR BLD AUTO: 0.1 % — SIGNIFICANT CHANGE UP (ref 0–2)
EOSINOPHIL # BLD AUTO: 0.12 K/UL — SIGNIFICANT CHANGE UP (ref 0–0.5)
EOSINOPHIL NFR BLD AUTO: 1.2 % — SIGNIFICANT CHANGE UP (ref 0–6)
HCT VFR BLD CALC: 21.5 % — LOW (ref 34.5–45)
HCT VFR BLD CALC: 23.1 % — LOW (ref 34.5–45)
HGB BLD-MCNC: 7.1 G/DL — LOW (ref 11.5–15.5)
HGB BLD-MCNC: 7.7 G/DL — LOW (ref 11.5–15.5)
IMM GRANULOCYTES NFR BLD AUTO: 0.9 % — SIGNIFICANT CHANGE UP (ref 0–1.5)
LYMPHOCYTES # BLD AUTO: 2.38 K/UL — SIGNIFICANT CHANGE UP (ref 1–3.3)
LYMPHOCYTES # BLD AUTO: 24.7 % — SIGNIFICANT CHANGE UP (ref 13–44)
MCHC RBC-ENTMCNC: 29.1 PG — SIGNIFICANT CHANGE UP (ref 27–34)
MCHC RBC-ENTMCNC: 29.3 PG — SIGNIFICANT CHANGE UP (ref 27–34)
MCHC RBC-ENTMCNC: 33 % — SIGNIFICANT CHANGE UP (ref 32–36)
MCHC RBC-ENTMCNC: 33.3 % — SIGNIFICANT CHANGE UP (ref 32–36)
MCV RBC AUTO: 87.2 FL — SIGNIFICANT CHANGE UP (ref 80–100)
MCV RBC AUTO: 88.8 FL — SIGNIFICANT CHANGE UP (ref 80–100)
MONOCYTES # BLD AUTO: 0.82 K/UL — SIGNIFICANT CHANGE UP (ref 0–0.9)
MONOCYTES NFR BLD AUTO: 8.5 % — SIGNIFICANT CHANGE UP (ref 2–14)
NEUTROPHILS # BLD AUTO: 6.21 K/UL — SIGNIFICANT CHANGE UP (ref 1.8–7.4)
NEUTROPHILS NFR BLD AUTO: 64.6 % — SIGNIFICANT CHANGE UP (ref 43–77)
NRBC # FLD: 0 K/UL — SIGNIFICANT CHANGE UP (ref 0–0)
NRBC # FLD: 0 K/UL — SIGNIFICANT CHANGE UP (ref 0–0)
PLATELET # BLD AUTO: 280 K/UL — SIGNIFICANT CHANGE UP (ref 150–400)
PLATELET # BLD AUTO: 291 K/UL — SIGNIFICANT CHANGE UP (ref 150–400)
PMV BLD: 10.2 FL — SIGNIFICANT CHANGE UP (ref 7–13)
PMV BLD: 9.8 FL — SIGNIFICANT CHANGE UP (ref 7–13)
RBC # BLD: 2.42 M/UL — LOW (ref 3.8–5.2)
RBC # BLD: 2.65 M/UL — LOW (ref 3.8–5.2)
RBC # FLD: 13.9 % — SIGNIFICANT CHANGE UP (ref 10.3–14.5)
RBC # FLD: 13.9 % — SIGNIFICANT CHANGE UP (ref 10.3–14.5)
WBC # BLD: 10.18 K/UL — SIGNIFICANT CHANGE UP (ref 3.8–10.5)
WBC # BLD: 9.63 K/UL — SIGNIFICANT CHANGE UP (ref 3.8–10.5)
WBC # FLD AUTO: 10.18 K/UL — SIGNIFICANT CHANGE UP (ref 3.8–10.5)
WBC # FLD AUTO: 9.63 K/UL — SIGNIFICANT CHANGE UP (ref 3.8–10.5)

## 2020-05-30 RX ORDER — ACETAMINOPHEN 500 MG
3 TABLET ORAL
Qty: 0 | Refills: 0 | DISCHARGE
Start: 2020-05-30

## 2020-05-30 RX ORDER — ASPIRIN/CALCIUM CARB/MAGNESIUM 324 MG
1 TABLET ORAL
Qty: 0 | Refills: 0 | DISCHARGE

## 2020-05-30 RX ADMIN — Medication 975 MILLIGRAM(S): at 12:08

## 2020-05-30 RX ADMIN — MAGNESIUM HYDROXIDE 30 MILLILITER(S): 400 TABLET, CHEWABLE ORAL at 06:05

## 2020-05-30 RX ADMIN — Medication 975 MILLIGRAM(S): at 17:31

## 2020-05-30 RX ADMIN — Medication 975 MILLIGRAM(S): at 06:05

## 2020-05-30 RX ADMIN — SIMETHICONE 80 MILLIGRAM(S): 80 TABLET, CHEWABLE ORAL at 06:05

## 2020-05-30 RX ADMIN — HEPARIN SODIUM 5000 UNIT(S): 5000 INJECTION INTRAVENOUS; SUBCUTANEOUS at 12:10

## 2020-05-30 RX ADMIN — Medication 325 MILLIGRAM(S): at 06:05

## 2020-05-30 RX ADMIN — SIMETHICONE 80 MILLIGRAM(S): 80 TABLET, CHEWABLE ORAL at 12:08

## 2020-05-30 RX ADMIN — Medication 500 MILLIGRAM(S): at 12:08

## 2020-05-30 RX ADMIN — Medication 325 MILLIGRAM(S): at 22:00

## 2020-05-30 RX ADMIN — Medication 325 MILLIGRAM(S): at 14:31

## 2020-05-30 NOTE — PROGRESS NOTE ADULT - SUBJECTIVE AND OBJECTIVE BOX
OB Progress Note: LTCS, POD#2    S: 35yo POD#2 s/p rLTCS c/b bowel injury s/p small bowel resection of distal ileum with primary stapled anastomosis. Her pain is well controlled. She is tolerating a regular diet, waiting to pass flatus. Denies N/V. Denies CP/SOB/lightheadedness/dizziness. She is ambulating without difficulty. Voiding spontaneously.     O:  Vitals:  Vital Signs Last 24 Hrs  T(C): 36.9 (30 May 2020 05:26), Max: 37.3 (29 May 2020 22:00)  T(F): 98.4 (30 May 2020 05:26), Max: 99.1 (29 May 2020 22:00)  HR: 100 (30 May 2020 05:26) (92 - 110)  BP: 131/83 (30 May 2020 05:26) (113/65 - 137/82)  BP(mean): --  RR: 16 (30 May 2020 05:26) (16 - 18)  SpO2: 100% (30 May 2020 05:26) (99% - 100%)    MEDICATIONS  (STANDING):  acetaminophen   Tablet .. 975 milliGRAM(s) Oral <User Schedule>  ascorbic acid 500 milliGRAM(s) Oral daily  diphtheria/tetanus/pertussis (acellular) Vaccine (ADAcel) 0.5 milliLiter(s) IntraMuscular once  famotidine Injectable 20 milliGRAM(s) IV Push once  ferrous    sulfate 325 milliGRAM(s) Oral three times a day  heparin   Injectable 5000 Unit(s) SubCutaneous every 12 hours  prenatal multivitamin 1 Tablet(s) Oral daily      MEDICATIONS  (PRN):  diphenhydrAMINE 25 milliGRAM(s) Oral every 6 hours PRN Itching  lanolin Ointment 1 Application(s) Topical every 6 hours PRN Sore Nipples  magnesium hydroxide Suspension 30 milliLiter(s) Oral two times a day PRN Constipation  oxyCODONE    IR 5 milliGRAM(s) Oral once PRN Moderate to Severe Pain (4-10)  oxyCODONE    IR 5 milliGRAM(s) Oral every 3 hours PRN Moderate to Severe Pain (4-10)  senna 1 Tablet(s) Oral two times a day PRN Constipation  simethicone 80 milliGRAM(s) Chew every 4 hours PRN Gas      Labs:  Blood type: B Positive  Rubella IgG: RPR: Negative                          7.6<L>   13.93<H> >-----------< 269    ( 05-29 @ 06:40 )             23.6<L>                        9.8<L>   7.08 >-----------< 279    ( 05-28 @ 09:53 )             30.7<L>          PE:  General: NAD  Abdomen: Soft, appropriately tender, incision c/d/i.  Extremities: No erythema, no pitting edema

## 2020-05-30 NOTE — PROGRESS NOTE ADULT - SUBJECTIVE AND OBJECTIVE BOX
Morning Surgical Progress Note  Patient is a 34y old  Female who presents with a chief complaint of scheduled repeat  (29 May 2020 15:47)      SUBJECTIVE: Patient seen and examined at bedside with surgical team, patient complains of bloating. She has not passed flatus or bowel movements. She denies nausea and vomiting but admits to incisional and gas pain.    Vital Signs Last 24 Hrs  T(C): 36.9 (30 May 2020 05:26), Max: 37.3 (29 May 2020 22:00)  T(F): 98.4 (30 May 2020 05:26), Max: 99.1 (29 May 2020 22:00)  HR: 100 (30 May 2020 05:26) (92 - 110)  BP: 131/83 (30 May 2020 05:26) (113/65 - 137/82)  BP(mean): --  RR: 16 (30 May 2020 05:26) (16 - 18)  SpO2: 100% (30 May 2020 05:26) (99% - 100%)I&O's Detail    29 May 2020 07:01  -  30 May 2020 07:00  --------------------------------------------------------  IN:  Total IN: 0 mL    OUT:    Voided: 900 mL  Total OUT: 900 mL    Total NET: -900 mL      MEDICATIONS  (STANDING):  acetaminophen   Tablet .. 975 milliGRAM(s) Oral <User Schedule>  ascorbic acid 500 milliGRAM(s) Oral daily  diphtheria/tetanus/pertussis (acellular) Vaccine (ADAcel) 0.5 milliLiter(s) IntraMuscular once  famotidine Injectable 20 milliGRAM(s) IV Push once  ferrous    sulfate 325 milliGRAM(s) Oral three times a day  heparin   Injectable 5000 Unit(s) SubCutaneous every 12 hours  prenatal multivitamin 1 Tablet(s) Oral daily    MEDICATIONS  (PRN):  diphenhydrAMINE 25 milliGRAM(s) Oral every 6 hours PRN Itching  lanolin Ointment 1 Application(s) Topical every 6 hours PRN Sore Nipples  magnesium hydroxide Suspension 30 milliLiter(s) Oral two times a day PRN Constipation  oxyCODONE    IR 5 milliGRAM(s) Oral once PRN Moderate to Severe Pain (4-10)  oxyCODONE    IR 5 milliGRAM(s) Oral every 3 hours PRN Moderate to Severe Pain (4-10)  senna 1 Tablet(s) Oral two times a day PRN Constipation  simethicone 80 milliGRAM(s) Chew every 4 hours PRN Gas      Physical Exam  Constitutional: A&Ox3, NAD  Respiratory: CTA b/l  Cardiac: RRR, S1 and S2, no m/r/g  Gastrointestinal: abdomen soft, appropriately tender to palpation, distended, Pfannensteil incision with dressing in place, c/d/i      LABS:                        7.1    9.63  )-----------( 280      ( 30 May 2020 06:15 )             21.5

## 2020-05-30 NOTE — PROGRESS NOTE ADULT - ASSESSMENT
A/P:   33 yo female, admitted for  RCD POD 2 complicated by extensive pelviabdominal adhesions and bowel resection due to adhesion.  Recovering well, surgery consult and follow up appreciated, patient, patient placed on clears per gen sx recommendations with plan to advance to regular in am.   Postop anemia stable/improving  h/h, patient is asymptomatic : plan iron replacement. Continue routine postop care, DVT prophylaxis, encourage ambulation.  Anticipate discharge in am. Patient advised to follow up with obstetrician for postop visit in 10-14 days. Discussed with ob team.  chau

## 2020-05-30 NOTE — PROGRESS NOTE ADULT - ASSESSMENT
35yo F s/p  section with intra-op consult for lysis of adhesions and SBR of distal ilium with primary stapled anastomosis . Patient is -/- for bowel function and complains of bloating.    Plan:  Back down to [facility]  Follow up am labs  Pain Control  Care per primary team    B Team   [id number]

## 2020-05-30 NOTE — PROGRESS NOTE ADULT - SUBJECTIVE AND OBJECTIVE BOX
Attending Note    She is a  34y woman     who is hospitalized for  delivery repeat at early term. with hx of inverted T uterine incision for previous delivery at 28 weeks 37w  37w  Pregnancy  Seasonal allergies  Pelvic adhesions  History of classical  section  Single enterectomy  Colectomy, ascending colon  Repeat  section  Endometriosis determined by laparoscopy      Subjective:  The patient feels well.  She reports: OOB, voiding, passing flatus, tolerating regular diet and clears. Denies palpitations, sob or chest pain    Physical exam:  She generally looks  well  Other:    Vital Signs Last 24 Hrs  T(C): 37.1 (30 May 2020 14:00), Max: 37.3 (29 May 2020 22:00)  T(F): 98.8 (30 May 2020 14:00), Max: 99.1 (29 May 2020 22:00)  HR: 105 (30 May 2020 14:00) (94 - 110)  BP: 139/83 (30 May 2020 14:00) (113/65 - 139/83)  RR: 18 (30 May 2020 14:00) (16 - 18)  SpO2: 100% (30 May 2020 14:00) (99% - 100%)    Lungs: Normal  Heart: Regular rate and rhythm  Abdomen: Soft, nontender, moderate distension, + BS in 4 Q  Incision: CDI  Fundus: Firm, appropriately tender  Pelvic: defered  Ext: No DVT signs, warm extremities, normal pulses      Allergies    No Known Allergies      MEDICATIONS  (STANDING):  acetaminophen   Tablet .. 975 milliGRAM(s) Oral <User Schedule>  ascorbic acid 500 milliGRAM(s) Oral daily  diphtheria/tetanus/pertussis (acellular) Vaccine (ADAcel) 0.5 milliLiter(s) IntraMuscular once  famotidine Injectable 20 milliGRAM(s) IV Push once  ferrous    sulfate 325 milliGRAM(s) Oral three times a day  heparin   Injectable 5000 Unit(s) SubCutaneous every 12 hours  prenatal multivitamin 1 Tablet(s) Oral daily    MEDICATIONS  (PRN):  diphenhydrAMINE 25 milliGRAM(s) Oral every 6 hours PRN Itching  lanolin Ointment 1 Application(s) Topical every 6 hours PRN Sore Nipples  magnesium hydroxide Suspension 30 milliLiter(s) Oral two times a day PRN Constipation  oxyCODONE    IR 5 milliGRAM(s) Oral once PRN Moderate to Severe Pain (4-10)  oxyCODONE    IR 5 milliGRAM(s) Oral every 3 hours PRN Moderate to Severe Pain (4-10)  senna 1 Tablet(s) Oral two times a day PRN Constipation  simethicone 80 milliGRAM(s) Chew every 4 hours PRN Gas      LABS:                        7.7    10.18 )-----------( 291      ( 30 May 2020 11:00 )             23.1                         7.1    9.63  )-----------( 280      ( 30 May 2020 06:15 )             21.5                         7.6    13.93 )-----------( 269      ( 29 May 2020 06:40 )             23.6

## 2020-05-30 NOTE — PROGRESS NOTE ADULT - ASSESSMENT
A/P: 35yo POD#2 s/p rLTCS c/b bowel injury s/p small bowel resection of distal ileum with primary stapled anastomosis. Patient is stable and doing well post-operatively.

## 2020-05-31 ENCOUNTER — TRANSCRIPTION ENCOUNTER (OUTPATIENT)
Age: 35
End: 2020-05-31

## 2020-05-31 VITALS
OXYGEN SATURATION: 100 % | DIASTOLIC BLOOD PRESSURE: 89 MMHG | SYSTOLIC BLOOD PRESSURE: 132 MMHG | HEART RATE: 92 BPM | RESPIRATION RATE: 17 BRPM | TEMPERATURE: 98 F

## 2020-05-31 RX ORDER — FERROUS SULFATE 325(65) MG
1 TABLET ORAL
Qty: 90 | Refills: 0
Start: 2020-05-31 | End: 2020-06-29

## 2020-05-31 RX ADMIN — HEPARIN SODIUM 5000 UNIT(S): 5000 INJECTION INTRAVENOUS; SUBCUTANEOUS at 00:30

## 2020-05-31 RX ADMIN — OXYCODONE HYDROCHLORIDE 5 MILLIGRAM(S): 5 TABLET ORAL at 02:30

## 2020-05-31 RX ADMIN — Medication 975 MILLIGRAM(S): at 02:30

## 2020-05-31 NOTE — DISCHARGE NOTE OB - CARE PROVIDER_API CALL
Carin Elias)  Obstetrics and Gynecology  [address] [place] [address]  Phone: [phone]  Fax: [phone]  Follow Up Time:

## 2020-05-31 NOTE — PROGRESS NOTE ADULT - SUBJECTIVE AND OBJECTIVE BOX
35yo POD#3 s/p rLTCS c/b bowel injury s/p small bowel resection of distal ileum with primary stapled anastomosis. Her pain is well controlled. She is tolerating a regular diet, waiting to pass flatus. Denies N/V. Denies CP/SOB/lightheadedness/dizziness. She is ambulating without difficulty. Voiding spontaneously.     O:  Vitals:  Vital Signs Last 24 Hrs  T(C): 36.7 (30 May 2020 22:34), Max: 37.1 (30 May 2020 14:00)  T(F): 98 (30 May 2020 22:34), Max: 98.8 (30 May 2020 14:00)  HR: 105 (30 May 2020 22:34) (100 - 105)  BP: 128/84 (30 May 2020 22:34) (128/84 - 139/83)  BP(mean): --  RR: 18 (30 May 2020 22:34) (16 - 18)  SpO2: 100% (30 May 2020 22:34) (100% - 100%)    MEDICATIONS  (STANDING):  acetaminophen   Tablet .. 975 milliGRAM(s) Oral <User Schedule>  ascorbic acid 500 milliGRAM(s) Oral daily  diphtheria/tetanus/pertussis (acellular) Vaccine (ADAcel) 0.5 milliLiter(s) IntraMuscular once  famotidine Injectable 20 milliGRAM(s) IV Push once  ferrous    sulfate 325 milliGRAM(s) Oral three times a day  heparin   Injectable 5000 Unit(s) SubCutaneous every 12 hours  prenatal multivitamin 1 Tablet(s) Oral daily    MEDICATIONS  (PRN):  diphenhydrAMINE 25 milliGRAM(s) Oral every 6 hours PRN Itching  lanolin Ointment 1 Application(s) Topical every 6 hours PRN Sore Nipples  magnesium hydroxide Suspension 30 milliLiter(s) Oral two times a day PRN Constipation  oxyCODONE    IR 5 milliGRAM(s) Oral once PRN Moderate to Severe Pain (4-10)  oxyCODONE    IR 5 milliGRAM(s) Oral every 3 hours PRN Moderate to Severe Pain (4-10)  senna 1 Tablet(s) Oral two times a day PRN Constipation  simethicone 80 milliGRAM(s) Chew every 4 hours PRN Gas      LABS:  Blood type: B Positive  Rubella IgG: RPR: Negative                          7.7<L>   10.18 >-----------< 291    ( 05-30 @ 11:00 )             23.1<L>                        7.1<L>   9.63 >-----------< 280    ( 05-30 @ 06:15 )             21.5<L>                        7.6<L>   13.93<H> >-----------< 269    ( 05-29 @ 06:40 )             23.6<L>                        9.8<L>   7.08 >-----------< 279    ( 05-28 @ 09:53 )             30.7<L>      Physical exam:  Gen: NAD  Abdomen: Soft, nontender, no distension , firm uterine fundus at umbilicus.  Incision: Clean, dry, and intact   Pelvic: Normal lochia noted  Ext: No calf tenderness

## 2020-05-31 NOTE — DISCHARGE NOTE OB - PLAN OF CARE
complete recovery without complications regular diet, activities as tolerated, nothing per vagina, follow up with obstetrician for postop visit in 10 days resolution of anemia stable anemia, iron therapy prescribed

## 2020-05-31 NOTE — DISCHARGE NOTE OB - HOSPITAL COURSE
Patient admitted for repeat  section. Patient was diagnosed with severe pelvic adhesions at the time of delivery. She underwent successful delivery of a viable girl.  Intraoperatively underwent resection and repair of the bowel due to severe adhesions. Patient had uncomplicated recovery. She was diagnosed and treated for postoperative anemia of acute blood loss. Patient was discharged on postoperative day 3 in stable condition and to follow up with obstetrician for postop visit

## 2020-05-31 NOTE — PROGRESS NOTE ADULT - PROBLEM SELECTOR PLAN 1
- Continue motrin, tylenol, oxycodone PRN for pain control.  - Increase ambulation  - Continue regular diet  - Discharge planning    Live Morris PGY1

## 2020-05-31 NOTE — DISCHARGE NOTE OB - MATERIALS PROVIDED
Tdap Vaccination (VIS Pub Date: 2012)/[facility] Hearing Screen Program/[facility]  Screening Program/Back To Sleep Handout/Shaken Baby Prevention Handout/Breastfeeding Guide and Packet/Vaccinations/Breastfeeding Mother’s Support Group Information/Guide to Postpartum Care

## 2020-05-31 NOTE — PROGRESS NOTE ADULT - ASSESSMENT
A/P: 35yo POD#3   s/p rLTCS c/b bowel injury s/p small bowel resection of distal ileum with primary stapled anastomosis. Patient is stable and doing well post-operatively.

## 2020-05-31 NOTE — PROGRESS NOTE ADULT - ASSESSMENT
33yo F s/p  section with intra-op consult for lysis of adhesions and SBR of distal ilium with primary stapled anastomosis . Patient is +/- for bowel function and states bloating/ gas pain is improved.    Plan:  Advance diet as tolerated  Follow up am labs  Pain Control  Care per primary team    B Team   [id number] 33yo F s/p  section with intra-op consult for lysis of adhesions and SBR of distal ilium with primary stapled anastomosis . Patient is +/- for bowel function and states bloating/ gas pain is improved. Now tolerating regular diet (pizza) and clears.    Plan:  Diet as tolerated  Follow up am labs  Pain Control  Care per primary team      B Team   [id number] 35yo F s/p  section with intra-op consult for lysis of adhesions and SBR of distal ilium with primary stapled anastomosis . Patient is +/- for bowel function and states bloating/ gas pain is improved. Now tolerating regular diet (pizza) and clears.    Plan:  Diet as tolerated  Follow up am labs  Pain Control  Care per primary team  No contraindications to diet/dispo      Please page B team surgery at [phone] with any further questions/concerns      B Team   [id number]

## 2020-05-31 NOTE — PROGRESS NOTE ADULT - SUBJECTIVE AND OBJECTIVE BOX
Morning Surgical Progress Note  Patient is a 34y old  Female who presents with a chief complaint of scheduled repeat  (29 May 2020 15:47)      SUBJECTIVE: Patient seen and examined at bedside with surgical team, patient states that her gas pain and bloating is improved. Patient is passing flatus but not bowel movements. Patient tolerates clears and denies nausea/vomiting.    Vital Signs Last 24 Hrs  T(C): 36.7 (30 May 2020 22:34), Max: 37.1 (30 May 2020 14:00)  T(F): 98 (30 May 2020 22:34), Max: 98.8 (30 May 2020 14:00)  HR: 105 (30 May 2020 22:34) (100 - 105)  BP: 128/84 (30 May 2020 22:34) (128/84 - 139/83)  BP(mean): --  RR: 18 (30 May 2020 22:34) (16 - 18)  SpO2: 100% (30 May 2020 22:34) (100% - 100%)I&O's Detail    29 May 2020 07:01  -  30 May 2020 07:00  --------------------------------------------------------  IN:  Total IN: 0 mL    OUT:    Voided: 900 mL  Total OUT: 900 mL    Total NET: -900 mL      MEDICATIONS  (STANDING):  acetaminophen   Tablet .. 975 milliGRAM(s) Oral <User Schedule>  ascorbic acid 500 milliGRAM(s) Oral daily  diphtheria/tetanus/pertussis (acellular) Vaccine (ADAcel) 0.5 milliLiter(s) IntraMuscular once  famotidine Injectable 20 milliGRAM(s) IV Push once  ferrous    sulfate 325 milliGRAM(s) Oral three times a day  heparin   Injectable 5000 Unit(s) SubCutaneous every 12 hours  prenatal multivitamin 1 Tablet(s) Oral daily    MEDICATIONS  (PRN):  diphenhydrAMINE 25 milliGRAM(s) Oral every 6 hours PRN Itching  lanolin Ointment 1 Application(s) Topical every 6 hours PRN Sore Nipples  magnesium hydroxide Suspension 30 milliLiter(s) Oral two times a day PRN Constipation  oxyCODONE    IR 5 milliGRAM(s) Oral once PRN Moderate to Severe Pain (4-10)  oxyCODONE    IR 5 milliGRAM(s) Oral every 3 hours PRN Moderate to Severe Pain (4-10)  senna 1 Tablet(s) Oral two times a day PRN Constipation  simethicone 80 milliGRAM(s) Chew every 4 hours PRN Gas      Physical Exam  Constitutional: A&Ox3, NAD  Respiratory: CTA b/l  Cardiac: RRR, S1 and S2, no m/r/g  Gastrointestinal: abdomen soft, appropriately tender to palpation, improved distention, Pfannensteil incision with dressing in place, c/d/i    LABS:                        7.7    10.18 )-----------( 291      ( 30 May 2020 11:00 )             23.1 Morning Surgical Progress Note  Patient is a 34y old  Female who presents with a chief complaint of scheduled repeat  (29 May 2020 15:47)      SUBJECTIVE: Patient seen and examined at bedside with surgical team, patient states that her gas pain and bloating is improved. Patient is passing flatus but not bowel movements. Patient tolerates pizza and clears and denies nausea/vomiting.    Vital Signs Last 24 Hrs  T(C): 36.7 (30 May 2020 22:34), Max: 37.1 (30 May 2020 14:00)  T(F): 98 (30 May 2020 22:34), Max: 98.8 (30 May 2020 14:00)  HR: 105 (30 May 2020 22:34) (100 - 105)  BP: 128/84 (30 May 2020 22:34) (128/84 - 139/83)  BP(mean): --  RR: 18 (30 May 2020 22:34) (16 - 18)  SpO2: 100% (30 May 2020 22:34) (100% - 100%)I&O's Detail    29 May 2020 07:01  -  30 May 2020 07:00  --------------------------------------------------------  IN:  Total IN: 0 mL    OUT:    Voided: 900 mL  Total OUT: 900 mL    Total NET: -900 mL      MEDICATIONS  (STANDING):  acetaminophen   Tablet .. 975 milliGRAM(s) Oral <User Schedule>  ascorbic acid 500 milliGRAM(s) Oral daily  diphtheria/tetanus/pertussis (acellular) Vaccine (ADAcel) 0.5 milliLiter(s) IntraMuscular once  famotidine Injectable 20 milliGRAM(s) IV Push once  ferrous    sulfate 325 milliGRAM(s) Oral three times a day  heparin   Injectable 5000 Unit(s) SubCutaneous every 12 hours  prenatal multivitamin 1 Tablet(s) Oral daily    MEDICATIONS  (PRN):  diphenhydrAMINE 25 milliGRAM(s) Oral every 6 hours PRN Itching  lanolin Ointment 1 Application(s) Topical every 6 hours PRN Sore Nipples  magnesium hydroxide Suspension 30 milliLiter(s) Oral two times a day PRN Constipation  oxyCODONE    IR 5 milliGRAM(s) Oral once PRN Moderate to Severe Pain (4-10)  oxyCODONE    IR 5 milliGRAM(s) Oral every 3 hours PRN Moderate to Severe Pain (4-10)  senna 1 Tablet(s) Oral two times a day PRN Constipation  simethicone 80 milliGRAM(s) Chew every 4 hours PRN Gas      Physical Exam  Constitutional: A&Ox3, NAD  Respiratory: CTA b/l  Cardiac: RRR, S1 and S2, no m/r/g  Gastrointestinal: abdomen soft, appropriately tender to palpation, improved distention, Pfannensteil incision with dressing in place, c/d/i    LABS:                        7.7    10.18 )-----------( 291      ( 30 May 2020 11:00 )             23.1

## 2020-05-31 NOTE — DISCHARGE NOTE OB - MEDICATION SUMMARY - MEDICATIONS TO TAKE
I will START or STAY ON the medications listed below when I get home from the hospital:    acetaminophen 325 mg oral tablet  -- 3 tab(s) by mouth , As Needed  -- Indication: For Pain    Prenatal Multivitamins with Folic Acid 1 mg oral tablet  -- 1 tab(s) by mouth once a day  -- Indication: For  delivery delivered    ferrous sulfate 325 mg (65 mg elemental iron) oral tablet  -- 1 tab(s) by mouth 3 times a day  -- Indication: For Postop anemia

## 2020-05-31 NOTE — DISCHARGE NOTE OB - PATIENT PORTAL LINK FT
You can access the FollowMyHealth Patient Portal offered by [facility] by registering at the following website: [email or web address]. By joining [employer]’s FollowMyHealth portal, you will also be able to view your health information using other applications (apps) compatible with our system.

## 2020-05-31 NOTE — DISCHARGE NOTE OB - CARE PLAN
Principal Discharge DX:	 delivery delivered  Goal:	complete recovery without complications  Assessment and plan of treatment:	regular diet, activities as tolerated, nothing per vagina, follow up with obstetrician for postop visit in 10 days  Secondary Diagnosis:	Postoperative anemia due to acute blood loss  Goal:	resolution of anemia  Assessment and plan of treatment:	stable anemia, iron therapy prescribed

## 2020-06-09 LAB — SURGICAL PATHOLOGY STUDY: SIGNIFICANT CHANGE UP

## 2020-06-16 ENCOUNTER — INPATIENT (INPATIENT)
Facility: HOSPITAL | Age: 35
LOS: 1 days | Discharge: HOME | End: 2020-06-18
Attending: OBSTETRICS & GYNECOLOGY | Admitting: OBSTETRICS & GYNECOLOGY
Payer: MEDICAID

## 2020-06-16 VITALS
DIASTOLIC BLOOD PRESSURE: 98 MMHG | TEMPERATURE: 98 F | RESPIRATION RATE: 11 BRPM | SYSTOLIC BLOOD PRESSURE: 141 MMHG | OXYGEN SATURATION: 100 %

## 2020-06-16 DIAGNOSIS — Z98.89 OTHER SPECIFIED POSTPROCEDURAL STATES: Chronic | ICD-10-CM

## 2020-06-16 DIAGNOSIS — O14.90 UNSPECIFIED PRE-ECLAMPSIA, UNSPECIFIED TRIMESTER: ICD-10-CM

## 2020-06-16 DIAGNOSIS — N80.9 ENDOMETRIOSIS, UNSPECIFIED: Chronic | ICD-10-CM

## 2020-06-16 DIAGNOSIS — O26.899 OTHER SPECIFIED PREGNANCY RELATED CONDITIONS, UNSPECIFIED TRIMESTER: ICD-10-CM

## 2020-06-16 LAB
ALBUMIN SERPL ELPH-MCNC: 4.1 G/DL — SIGNIFICANT CHANGE UP (ref 3.3–5)
ALP SERPL-CCNC: 99 U/L — SIGNIFICANT CHANGE UP (ref 40–120)
ALT FLD-CCNC: 6 U/L — SIGNIFICANT CHANGE UP (ref 4–33)
ANION GAP SERPL CALC-SCNC: 13 MMO/L — SIGNIFICANT CHANGE UP (ref 7–14)
ANISOCYTOSIS BLD QL: SLIGHT — SIGNIFICANT CHANGE UP
ANTIBODY ID 1_1: SIGNIFICANT CHANGE UP
ANTIBODY ID 1_2: SIGNIFICANT CHANGE UP
ANTIBODY ID 1_3: SIGNIFICANT CHANGE UP
APTT BLD: 30.9 SEC — SIGNIFICANT CHANGE UP (ref 27.5–36.3)
AST SERPL-CCNC: 9 U/L — SIGNIFICANT CHANGE UP (ref 4–32)
BASOPHILS # BLD AUTO: 0.04 K/UL — SIGNIFICANT CHANGE UP (ref 0–0.2)
BASOPHILS NFR BLD AUTO: 0.6 % — SIGNIFICANT CHANGE UP (ref 0–2)
BASOPHILS NFR SPEC: 0 % — SIGNIFICANT CHANGE UP (ref 0–2)
BILIRUB SERPL-MCNC: 0.4 MG/DL — SIGNIFICANT CHANGE UP (ref 0.2–1.2)
BLASTS # FLD: 0 % — SIGNIFICANT CHANGE UP (ref 0–0)
BUN SERPL-MCNC: 10 MG/DL — SIGNIFICANT CHANGE UP (ref 7–23)
CALCIUM SERPL-MCNC: 9 MG/DL — SIGNIFICANT CHANGE UP (ref 8.4–10.5)
CHLORIDE SERPL-SCNC: 104 MMOL/L — SIGNIFICANT CHANGE UP (ref 98–107)
CO2 SERPL-SCNC: 23 MMOL/L — SIGNIFICANT CHANGE UP (ref 22–31)
CREAT SERPL-MCNC: 0.94 MG/DL — SIGNIFICANT CHANGE UP (ref 0.5–1.3)
EOSINOPHIL # BLD AUTO: 0.31 K/UL — SIGNIFICANT CHANGE UP (ref 0–0.5)
EOSINOPHIL NFR BLD AUTO: 4.9 % — SIGNIFICANT CHANGE UP (ref 0–6)
EOSINOPHIL NFR FLD: 5.2 % — SIGNIFICANT CHANGE UP (ref 0–6)
FIBRINOGEN PPP-MCNC: 543 MG/DL — HIGH (ref 300–520)
GIANT PLATELETS BLD QL SMEAR: PRESENT — SIGNIFICANT CHANGE UP
GLUCOSE SERPL-MCNC: 98 MG/DL — SIGNIFICANT CHANGE UP (ref 70–99)
HCT VFR BLD CALC: 30.1 % — LOW (ref 34.5–45)
HGB BLD-MCNC: 9.4 G/DL — LOW (ref 11.5–15.5)
IMM GRANULOCYTES NFR BLD AUTO: 0.3 % — SIGNIFICANT CHANGE UP (ref 0–1.5)
INR BLD: 1.12 — SIGNIFICANT CHANGE UP (ref 0.88–1.17)
LDH SERPL L TO P-CCNC: 197 U/L — SIGNIFICANT CHANGE UP (ref 135–225)
LYMPHOCYTES # BLD AUTO: 2.4 K/UL — SIGNIFICANT CHANGE UP (ref 1–3.3)
LYMPHOCYTES # BLD AUTO: 37.7 % — SIGNIFICANT CHANGE UP (ref 13–44)
LYMPHOCYTES NFR SPEC AUTO: 33.6 % — SIGNIFICANT CHANGE UP (ref 13–44)
MCHC RBC-ENTMCNC: 26.3 PG — LOW (ref 27–34)
MCHC RBC-ENTMCNC: 31.2 % — LOW (ref 32–36)
MCV RBC AUTO: 84.1 FL — SIGNIFICANT CHANGE UP (ref 80–100)
METAMYELOCYTES # FLD: 0 % — SIGNIFICANT CHANGE UP (ref 0–1)
MONOCYTES # BLD AUTO: 0.41 K/UL — SIGNIFICANT CHANGE UP (ref 0–0.9)
MONOCYTES NFR BLD AUTO: 6.4 % — SIGNIFICANT CHANGE UP (ref 2–14)
MONOCYTES NFR BLD: 6 % — SIGNIFICANT CHANGE UP (ref 2–9)
MYELOCYTES NFR BLD: 0 % — SIGNIFICANT CHANGE UP (ref 0–0)
NEUTROPHIL AB SER-ACNC: 54.3 % — SIGNIFICANT CHANGE UP (ref 43–77)
NEUTROPHILS # BLD AUTO: 3.18 K/UL — SIGNIFICANT CHANGE UP (ref 1.8–7.4)
NEUTROPHILS NFR BLD AUTO: 50.1 % — SIGNIFICANT CHANGE UP (ref 43–77)
NEUTS BAND # BLD: 0 % — SIGNIFICANT CHANGE UP (ref 0–6)
NRBC # FLD: 0 K/UL — SIGNIFICANT CHANGE UP (ref 0–0)
OTHER - HEMATOLOGY %: 0 — SIGNIFICANT CHANGE UP
PLATELET # BLD AUTO: 568 K/UL — HIGH (ref 150–400)
PLATELET COUNT - ESTIMATE: NORMAL — SIGNIFICANT CHANGE UP
PMV BLD: 9.4 FL — SIGNIFICANT CHANGE UP (ref 7–13)
POTASSIUM SERPL-MCNC: 3.8 MMOL/L — SIGNIFICANT CHANGE UP (ref 3.5–5.3)
POTASSIUM SERPL-SCNC: 3.8 MMOL/L — SIGNIFICANT CHANGE UP (ref 3.5–5.3)
PROMYELOCYTES # FLD: 0 % — SIGNIFICANT CHANGE UP (ref 0–0)
PROT SERPL-MCNC: 7.8 G/DL — SIGNIFICANT CHANGE UP (ref 6–8.3)
PROTHROM AB SERPL-ACNC: 12.9 SEC — SIGNIFICANT CHANGE UP (ref 9.8–13.1)
RBC # BLD: 3.58 M/UL — LOW (ref 3.8–5.2)
RBC # FLD: 14.4 % — SIGNIFICANT CHANGE UP (ref 10.3–14.5)
SMUDGE CELLS # BLD: PRESENT — SIGNIFICANT CHANGE UP
SODIUM SERPL-SCNC: 140 MMOL/L — SIGNIFICANT CHANGE UP (ref 135–145)
URATE SERPL-MCNC: 3.1 MG/DL — SIGNIFICANT CHANGE UP (ref 2.5–7)
VARIANT LYMPHS # BLD: 0.9 % — SIGNIFICANT CHANGE UP
WBC # BLD: 6.36 K/UL — SIGNIFICANT CHANGE UP (ref 3.8–10.5)
WBC # FLD AUTO: 6.36 K/UL — SIGNIFICANT CHANGE UP (ref 3.8–10.5)

## 2020-06-16 PROCEDURE — 86077 PHYS BLOOD BANK SERV XMATCH: CPT

## 2020-06-16 RX ORDER — ACETAMINOPHEN 500 MG
975 TABLET ORAL ONCE
Refills: 0 | Status: COMPLETED | OUTPATIENT
Start: 2020-06-16 | End: 2020-06-16

## 2020-06-16 RX ORDER — MAGNESIUM SULFATE 500 MG/ML
2 VIAL (ML) INJECTION
Qty: 40 | Refills: 0 | Status: DISCONTINUED | OUTPATIENT
Start: 2020-06-16 | End: 2020-06-17

## 2020-06-16 RX ORDER — LABETALOL HCL 100 MG
20 TABLET ORAL ONCE
Refills: 0 | Status: COMPLETED | OUTPATIENT
Start: 2020-06-16 | End: 2020-06-16

## 2020-06-16 RX ORDER — LABETALOL HCL 100 MG
200 TABLET ORAL
Refills: 0 | Status: DISCONTINUED | OUTPATIENT
Start: 2020-06-16 | End: 2020-06-18

## 2020-06-16 RX ORDER — LABETALOL HCL 100 MG
40 TABLET ORAL ONCE
Refills: 0 | Status: COMPLETED | OUTPATIENT
Start: 2020-06-16 | End: 2020-06-16

## 2020-06-16 RX ORDER — MAGNESIUM SULFATE 500 MG/ML
4 VIAL (ML) INJECTION ONCE
Refills: 0 | Status: COMPLETED | OUTPATIENT
Start: 2020-06-16 | End: 2020-06-16

## 2020-06-16 RX ORDER — SODIUM CHLORIDE 9 MG/ML
1000 INJECTION, SOLUTION INTRAVENOUS
Refills: 0 | Status: DISCONTINUED | OUTPATIENT
Start: 2020-06-16 | End: 2020-06-17

## 2020-06-16 RX ADMIN — Medication 40 MILLIGRAM(S): at 18:52

## 2020-06-16 RX ADMIN — Medication 50 GM/HR: at 19:07

## 2020-06-16 RX ADMIN — Medication 50 GM/HR: at 22:41

## 2020-06-16 RX ADMIN — SODIUM CHLORIDE 50 MILLILITER(S): 9 INJECTION, SOLUTION INTRAVENOUS at 18:45

## 2020-06-16 RX ADMIN — Medication 200 MILLIGRAM(S): at 19:19

## 2020-06-16 RX ADMIN — Medication 975 MILLIGRAM(S): at 18:28

## 2020-06-16 RX ADMIN — Medication 300 GRAM(S): at 18:45

## 2020-06-16 RX ADMIN — Medication 20 MILLIGRAM(S): at 18:32

## 2020-06-16 NOTE — H&P ADULT - ASSESSMENT
33 YO PP Repeat C/S on 5/28/20 with Postpartum Severe preeclampsia  Admit  IVP Labetelol at 1828 (20 mg IVP)  IVP Labetaliol @ 1849 (40 mg IVP)  Magnesium   Oral antihypertensives for BP Control  Strict I/O  CT Head   Plan Dw Dr Huffman 9OB Attending) and will report to OB Residency Team and OB Service Attendings

## 2020-06-16 NOTE — H&P ADULT - HISTORY OF PRESENT ILLNESS
33 yo AA female  presents from Primary OB's office for evaluation of a HA x 1 week s/p C/S 5/28/2020 ( Repeat ) and elevated BP's in the office (No written BP from Office) On arrival initial /101 , followed by BP of 149/94  PNC: Dr Elias  Patient denies c/o Visual changes, RUQ pain, N/V/D and reports HA 2/10 on pain scale

## 2020-06-16 NOTE — H&P ADULT - NSHPPHYSICALEXAM_GEN_ALL_CORE
A/O x3  NAD  BP's 140's-180's systolics over diastolics 70's-113  Heart: RRR  Lungs: BCTA  Abdomen is soft NT no pain in RUQ illicit and C/S incision is dry and intact with no erythema, edema at this time  Normal reflexes B/L  No Pedal edema B/L

## 2020-06-16 NOTE — CHART NOTE - NSCHARTNOTEFT_GEN_A_CORE
Attending note    Patient is a P2 who presented to triage from the office with elevated blood pressure of 140/100 and complaint of headache. Patient with history of postpartum preeclampsia with first pregnancy.  Patient is s/p Lab IV 20/40mg. Patient was started on magnesium sulfate. Patient evaluated at bedside. Patient reports headache has improved now 1/10. Denies visual changes, epigastric pain, shortness of breath, palpitations,etc  VS T 36.8  R 12  /83  O2 sat 97% RA    heent mild facial edema  CV RRR  abd soft nontender  ext no CCE  neuro AAOx 3  labs WBC 6.36  H/ 9.4/30.1 plts 568K  AST 9  ALT 6   Uric acid 3.1    A: HD#0 s/p RCS > 2wks ago with postpartum preeclampsia on magnesium sulfate  P: Continue magnesium sulfate and lab 200mg BID for blood pressure control     Continue in house close observation     MBeauvil

## 2020-06-16 NOTE — H&P ADULT - NSHPREVIEWOFSYSTEMS_GEN_ALL_CORE
A/O x3  NAD  BP's 140's-180's systolics over diastolics 70's-113  Heart: RRR  Lungs: BCTA  Abdomen is soft NT no pain in RUQ illicit and C/S inscion is dry and intact with no erythema, edema at this time  Normal reflexes B/L  No Pedal edema B/L

## 2020-06-16 NOTE — H&P ADULT - NSICDXPASTMEDICALHX_GEN_ALL_CORE_FT
PAST MEDICAL HISTORY:  Pregnancy     Seasonal allergies
Clear bilaterally, pupils equal, round and reactive to light.

## 2020-06-16 NOTE — CHART NOTE - NSCHARTNOTEFT_GEN_A_CORE
Name:  IVETTE MACARIO    MRN:  [id number]    Chief Complaint:  Elevated BPs at home (140/100), seeing spots and h/a not relieved by percocet    EDC:  S/P C/S on 5/27    Gestational Age:    Parity: P2    Ob History:      Vital Signs:  /97    Acuity Score: 1    Additional Comments:  severe h/a

## 2020-06-17 ENCOUNTER — TRANSCRIPTION ENCOUNTER (OUTPATIENT)
Age: 35
End: 2020-06-17

## 2020-06-17 PROBLEM — Z34.90 ENCOUNTER FOR SUPERVISION OF NORMAL PREGNANCY, UNSPECIFIED, UNSPECIFIED TRIMESTER: Chronic | Status: ACTIVE | Noted: 2020-05-22

## 2020-06-17 LAB
ALBUMIN SERPL ELPH-MCNC: 4.1 G/DL — SIGNIFICANT CHANGE UP (ref 3.3–5)
ALP SERPL-CCNC: 92 U/L — SIGNIFICANT CHANGE UP (ref 40–120)
ALT FLD-CCNC: < 5 U/L — SIGNIFICANT CHANGE UP (ref 4–33)
ANION GAP SERPL CALC-SCNC: 13 MMO/L — SIGNIFICANT CHANGE UP (ref 7–14)
APTT BLD: 34.6 SEC — SIGNIFICANT CHANGE UP (ref 27.5–36.3)
AST SERPL-CCNC: 12 U/L — SIGNIFICANT CHANGE UP (ref 4–32)
BASOPHILS # BLD AUTO: 0.03 K/UL — SIGNIFICANT CHANGE UP (ref 0–0.2)
BASOPHILS NFR BLD AUTO: 0.6 % — SIGNIFICANT CHANGE UP (ref 0–2)
BILIRUB SERPL-MCNC: 0.2 MG/DL — SIGNIFICANT CHANGE UP (ref 0.2–1.2)
BUN SERPL-MCNC: 8 MG/DL — SIGNIFICANT CHANGE UP (ref 7–23)
CALCIUM SERPL-MCNC: 7.8 MG/DL — LOW (ref 8.4–10.5)
CHLORIDE SERPL-SCNC: 102 MMOL/L — SIGNIFICANT CHANGE UP (ref 98–107)
CO2 SERPL-SCNC: 23 MMOL/L — SIGNIFICANT CHANGE UP (ref 22–31)
CREAT SERPL-MCNC: 0.67 MG/DL — SIGNIFICANT CHANGE UP (ref 0.5–1.3)
EOSINOPHIL # BLD AUTO: 0.26 K/UL — SIGNIFICANT CHANGE UP (ref 0–0.5)
EOSINOPHIL NFR BLD AUTO: 5.3 % — SIGNIFICANT CHANGE UP (ref 0–6)
FIBRINOGEN PPP-MCNC: 493 MG/DL — SIGNIFICANT CHANGE UP (ref 300–520)
GLUCOSE SERPL-MCNC: 109 MG/DL — HIGH (ref 70–99)
HCT VFR BLD CALC: 29.6 % — LOW (ref 34.5–45)
HCT VFR BLD CALC: 30.5 % — LOW (ref 34.5–45)
HGB BLD-MCNC: 9 G/DL — LOW (ref 11.5–15.5)
HGB BLD-MCNC: 9.6 G/DL — LOW (ref 11.5–15.5)
IMM GRANULOCYTES NFR BLD AUTO: 0.2 % — SIGNIFICANT CHANGE UP (ref 0–1.5)
INR BLD: 1.01 — SIGNIFICANT CHANGE UP (ref 0.88–1.17)
LDH SERPL L TO P-CCNC: 167 U/L — SIGNIFICANT CHANGE UP (ref 135–225)
LYMPHOCYTES # BLD AUTO: 1.86 K/UL — SIGNIFICANT CHANGE UP (ref 1–3.3)
LYMPHOCYTES # BLD AUTO: 38.3 % — SIGNIFICANT CHANGE UP (ref 13–44)
MAGNESIUM SERPL-MCNC: 5.1 MG/DL — HIGH (ref 1.6–2.6)
MAGNESIUM SERPL-MCNC: 6 MG/DL — HIGH (ref 1.6–2.6)
MAGNESIUM SERPL-MCNC: 7 MG/DL — CRITICAL HIGH (ref 1.6–2.6)
MCHC RBC-ENTMCNC: 26.2 PG — LOW (ref 27–34)
MCHC RBC-ENTMCNC: 26.7 PG — LOW (ref 27–34)
MCHC RBC-ENTMCNC: 30.4 % — LOW (ref 32–36)
MCHC RBC-ENTMCNC: 31.5 % — LOW (ref 32–36)
MCV RBC AUTO: 85 FL — SIGNIFICANT CHANGE UP (ref 80–100)
MCV RBC AUTO: 86 FL — SIGNIFICANT CHANGE UP (ref 80–100)
MONOCYTES # BLD AUTO: 0.32 K/UL — SIGNIFICANT CHANGE UP (ref 0–0.9)
MONOCYTES NFR BLD AUTO: 6.6 % — SIGNIFICANT CHANGE UP (ref 2–14)
NEUTROPHILS # BLD AUTO: 2.38 K/UL — SIGNIFICANT CHANGE UP (ref 1.8–7.4)
NEUTROPHILS NFR BLD AUTO: 49 % — SIGNIFICANT CHANGE UP (ref 43–77)
NRBC # FLD: 0 K/UL — SIGNIFICANT CHANGE UP (ref 0–0)
NRBC # FLD: 0 K/UL — SIGNIFICANT CHANGE UP (ref 0–0)
PLATELET # BLD AUTO: 519 K/UL — HIGH (ref 150–400)
PLATELET # BLD AUTO: 526 K/UL — HIGH (ref 150–400)
PMV BLD: 9.3 FL — SIGNIFICANT CHANGE UP (ref 7–13)
PMV BLD: 9.4 FL — SIGNIFICANT CHANGE UP (ref 7–13)
POTASSIUM SERPL-MCNC: 3.8 MMOL/L — SIGNIFICANT CHANGE UP (ref 3.5–5.3)
POTASSIUM SERPL-SCNC: 3.8 MMOL/L — SIGNIFICANT CHANGE UP (ref 3.5–5.3)
PROT SERPL-MCNC: 7.5 G/DL — SIGNIFICANT CHANGE UP (ref 6–8.3)
PROTHROM AB SERPL-ACNC: 11.6 SEC — SIGNIFICANT CHANGE UP (ref 9.8–13.1)
RBC # BLD: 3.44 M/UL — LOW (ref 3.8–5.2)
RBC # BLD: 3.59 M/UL — LOW (ref 3.8–5.2)
RBC # FLD: 14.3 % — SIGNIFICANT CHANGE UP (ref 10.3–14.5)
RBC # FLD: 14.3 % — SIGNIFICANT CHANGE UP (ref 10.3–14.5)
SARS-COV-2 RNA SPEC QL NAA+PROBE: SIGNIFICANT CHANGE UP
SODIUM SERPL-SCNC: 138 MMOL/L — SIGNIFICANT CHANGE UP (ref 135–145)
URATE SERPL-MCNC: 2.7 MG/DL — SIGNIFICANT CHANGE UP (ref 2.5–7)
WBC # BLD: 4.86 K/UL — SIGNIFICANT CHANGE UP (ref 3.8–10.5)
WBC # BLD: 6.3 K/UL — SIGNIFICANT CHANGE UP (ref 3.8–10.5)
WBC # FLD AUTO: 4.86 K/UL — SIGNIFICANT CHANGE UP (ref 3.8–10.5)
WBC # FLD AUTO: 6.3 K/UL — SIGNIFICANT CHANGE UP (ref 3.8–10.5)

## 2020-06-17 PROCEDURE — 70450 CT HEAD/BRAIN W/O DYE: CPT | Mod: 26

## 2020-06-17 RX ORDER — SODIUM CHLORIDE 9 MG/ML
1000 INJECTION, SOLUTION INTRAVENOUS
Refills: 0 | Status: DISCONTINUED | OUTPATIENT
Start: 2020-06-17 | End: 2020-06-18

## 2020-06-17 RX ORDER — MAGNESIUM SULFATE 500 MG/ML
1.5 VIAL (ML) INJECTION
Qty: 40 | Refills: 0 | Status: DISCONTINUED | OUTPATIENT
Start: 2020-06-17 | End: 2020-06-17

## 2020-06-17 RX ORDER — ACETAMINOPHEN 500 MG
975 TABLET ORAL ONCE
Refills: 0 | Status: COMPLETED | OUTPATIENT
Start: 2020-06-17 | End: 2020-06-17

## 2020-06-17 RX ORDER — ACETAMINOPHEN 500 MG
975 TABLET ORAL EVERY 6 HOURS
Refills: 0 | Status: DISCONTINUED | OUTPATIENT
Start: 2020-06-17 | End: 2020-06-18

## 2020-06-17 RX ADMIN — Medication 200 MILLIGRAM(S): at 17:57

## 2020-06-17 RX ADMIN — Medication 50 GM/HR: at 07:23

## 2020-06-17 RX ADMIN — Medication 37.5 GM/HR: at 18:01

## 2020-06-17 RX ADMIN — Medication 200 MILLIGRAM(S): at 06:36

## 2020-06-17 RX ADMIN — Medication 975 MILLIGRAM(S): at 16:52

## 2020-06-17 NOTE — DISCHARGE NOTE PROVIDER - NSDCFUADDAPPT_GEN_ALL_CORE_FT
- Continue BP meds as prescribed (hold is BP is under 110/60 or HR is under 60)  -Take blood pressure with at home cuff prior to taking medications; if BP is >150/90 call MD  - Return to hospital with headaches, visual changes, abdominal pain, nausea, vomiting, chest pain or shortness of breathe  - Follow up with OB in 2 days for BP check  - Follow up with [facility] (call [phone])

## 2020-06-17 NOTE — PROGRESS NOTE ADULT - PROBLEM SELECTOR PLAN 1
Neuro: No pain. Tylenol prn for headache.  CV: Hemodynamically stable. BPs well controlled on labetalol 200 BID. F/u AM HELLP labs. Continue Mag until 7pm. Mag level therapeutic at 6.  Pulm: Saturating well on room air, encourage oob/amb.  GI: Continue regular diet.  : Voiding spontaneously, UOP adequate. Strict Is/Os.  Heme: SCDs and ambulation for DVT ppx.  FEN: LR@50 while on mag infusion.  ID: Afebrile.  Endo: No active issues.     Jesenia Lamb PGY-2

## 2020-06-17 NOTE — DISCHARGE NOTE PROVIDER - HOSPITAL COURSE
34y POD#20 status post rC/S, admitted for postpartum sPEC. On admission patient with severe range BPs and headache. Pt given IVP labetalol 20/40mg on admission and started on MgSo4 and Labetalol 200mg BID. BPs well controlled on labetalol 200 BID. HELLP labs WNL. CT head done with no acute findings. Since admission headaches resolved, denies visual changes, RUQ pain, n/v. 34y POD#20 status post rC/S, admitted for postpartum sPEC. On admission patient with severe range BPs and headache. Pt given IVP labetalol 20/40mg on admission and started on MgSo4 and Labetalol 200mg BID. BPs well controlled on labetalol 200 BID. HELLP labs WNL. CT head done with no acute findings. Since admission headaches resolved, denies visual changes, RUQ pain, n/v. Pt is stable for discharge home with close outpatient BP monitoring and follow up with OB in two days.

## 2020-06-17 NOTE — DISCHARGE NOTE PROVIDER - CARE PROVIDER_API CALL
Carin Elias)  Obstetrics and Gynecology  [address] [place] [address]  Phone: [phone]  Fax: [phone]  Follow Up Time:

## 2020-06-17 NOTE — DISCHARGE NOTE PROVIDER - NSDCCPCAREPLAN_GEN_ALL_CORE_FT
PRINCIPAL DISCHARGE DIAGNOSIS  Diagnosis: Pre-eclampsia, postpartum  Assessment and Plan of Treatment: - Continue BP meds as prescribed (hold is BP is under 110/60 or HR is under 60)  -Take blood pressure with at home cuff prior to taking medications; if BP is >150/90 call MD  - Return to hospital with headaches, visual changes, abdominal pain, nausea, vomiting, chest pain or shortness of breathe  - Follow up with OB in 2 days for BP check  - Follow up with [facility] (call [phone])

## 2020-06-17 NOTE — DISCHARGE NOTE PROVIDER - NSDCMRMEDTOKEN_GEN_ALL_CORE_FT
acetaminophen 325 mg oral tablet: 3 tab(s) orally , As Needed  ferrous sulfate 325 mg (65 mg elemental iron) oral tablet: 1 tab(s) orally 3 times a day  Prenatal Multivitamins with Folic Acid 1 mg oral tablet: 1 tab(s) orally once a day labetalol 200 mg oral tablet: 1 tab(s) orally 2 times a day  Prenatal Multivitamins with Folic Acid 1 mg oral tablet: 1 tab(s) orally once a day

## 2020-06-18 ENCOUNTER — TRANSCRIPTION ENCOUNTER (OUTPATIENT)
Age: 35
End: 2020-06-18

## 2020-06-18 VITALS
OXYGEN SATURATION: 99 % | TEMPERATURE: 98 F | DIASTOLIC BLOOD PRESSURE: 80 MMHG | RESPIRATION RATE: 18 BRPM | HEART RATE: 78 BPM | SYSTOLIC BLOOD PRESSURE: 125 MMHG

## 2020-06-18 LAB
ALBUMIN SERPL ELPH-MCNC: 4.1 G/DL — SIGNIFICANT CHANGE UP (ref 3.3–5)
ALP SERPL-CCNC: 96 U/L — SIGNIFICANT CHANGE UP (ref 40–120)
ALT FLD-CCNC: < 5 U/L — SIGNIFICANT CHANGE UP (ref 4–33)
ANION GAP SERPL CALC-SCNC: 12 MMO/L — SIGNIFICANT CHANGE UP (ref 7–14)
APTT BLD: 33.3 SEC — SIGNIFICANT CHANGE UP (ref 27.5–36.3)
AST SERPL-CCNC: 7 U/L — SIGNIFICANT CHANGE UP (ref 4–32)
BASOPHILS # BLD AUTO: 0.02 K/UL — SIGNIFICANT CHANGE UP (ref 0–0.2)
BASOPHILS NFR BLD AUTO: 0.3 % — SIGNIFICANT CHANGE UP (ref 0–2)
BILIRUB SERPL-MCNC: 0.3 MG/DL — SIGNIFICANT CHANGE UP (ref 0.2–1.2)
BUN SERPL-MCNC: 10 MG/DL — SIGNIFICANT CHANGE UP (ref 7–23)
CALCIUM SERPL-MCNC: 8.3 MG/DL — LOW (ref 8.4–10.5)
CHLORIDE SERPL-SCNC: 105 MMOL/L — SIGNIFICANT CHANGE UP (ref 98–107)
CO2 SERPL-SCNC: 23 MMOL/L — SIGNIFICANT CHANGE UP (ref 22–31)
CREAT SERPL-MCNC: 0.77 MG/DL — SIGNIFICANT CHANGE UP (ref 0.5–1.3)
EOSINOPHIL # BLD AUTO: 0.31 K/UL — SIGNIFICANT CHANGE UP (ref 0–0.5)
EOSINOPHIL NFR BLD AUTO: 5.1 % — SIGNIFICANT CHANGE UP (ref 0–6)
FIBRINOGEN PPP-MCNC: 520 MG/DL — SIGNIFICANT CHANGE UP (ref 300–520)
GLUCOSE SERPL-MCNC: 95 MG/DL — SIGNIFICANT CHANGE UP (ref 70–99)
HCT VFR BLD CALC: 31.1 % — LOW (ref 34.5–45)
HGB BLD-MCNC: 9.9 G/DL — LOW (ref 11.5–15.5)
IMM GRANULOCYTES NFR BLD AUTO: 0.3 % — SIGNIFICANT CHANGE UP (ref 0–1.5)
INR BLD: 1.07 — SIGNIFICANT CHANGE UP (ref 0.88–1.17)
LDH SERPL L TO P-CCNC: 165 U/L — SIGNIFICANT CHANGE UP (ref 135–225)
LYMPHOCYTES # BLD AUTO: 2.04 K/UL — SIGNIFICANT CHANGE UP (ref 1–3.3)
LYMPHOCYTES # BLD AUTO: 33.7 % — SIGNIFICANT CHANGE UP (ref 13–44)
MCHC RBC-ENTMCNC: 27.3 PG — SIGNIFICANT CHANGE UP (ref 27–34)
MCHC RBC-ENTMCNC: 31.8 % — LOW (ref 32–36)
MCV RBC AUTO: 85.7 FL — SIGNIFICANT CHANGE UP (ref 80–100)
MONOCYTES # BLD AUTO: 0.33 K/UL — SIGNIFICANT CHANGE UP (ref 0–0.9)
MONOCYTES NFR BLD AUTO: 5.4 % — SIGNIFICANT CHANGE UP (ref 2–14)
NEUTROPHILS # BLD AUTO: 3.34 K/UL — SIGNIFICANT CHANGE UP (ref 1.8–7.4)
NEUTROPHILS NFR BLD AUTO: 55.2 % — SIGNIFICANT CHANGE UP (ref 43–77)
NRBC # FLD: 0 K/UL — SIGNIFICANT CHANGE UP (ref 0–0)
PLATELET # BLD AUTO: 525 K/UL — HIGH (ref 150–400)
PMV BLD: 9.4 FL — SIGNIFICANT CHANGE UP (ref 7–13)
POTASSIUM SERPL-MCNC: 4.1 MMOL/L — SIGNIFICANT CHANGE UP (ref 3.5–5.3)
POTASSIUM SERPL-SCNC: 4.1 MMOL/L — SIGNIFICANT CHANGE UP (ref 3.5–5.3)
PROT SERPL-MCNC: 7.6 G/DL — SIGNIFICANT CHANGE UP (ref 6–8.3)
PROTHROM AB SERPL-ACNC: 12.3 SEC — SIGNIFICANT CHANGE UP (ref 9.8–13.1)
RBC # BLD: 3.63 M/UL — LOW (ref 3.8–5.2)
RBC # FLD: 14.5 % — SIGNIFICANT CHANGE UP (ref 10.3–14.5)
SODIUM SERPL-SCNC: 140 MMOL/L — SIGNIFICANT CHANGE UP (ref 135–145)
URATE SERPL-MCNC: 2.7 MG/DL — SIGNIFICANT CHANGE UP (ref 2.5–7)
WBC # BLD: 6.06 K/UL — SIGNIFICANT CHANGE UP (ref 3.8–10.5)
WBC # FLD AUTO: 6.06 K/UL — SIGNIFICANT CHANGE UP (ref 3.8–10.5)

## 2020-06-18 RX ORDER — LABETALOL HCL 100 MG
1 TABLET ORAL
Qty: 60 | Refills: 0
Start: 2020-06-18 | End: 2020-07-17

## 2020-06-18 RX ADMIN — Medication 200 MILLIGRAM(S): at 05:37

## 2020-06-18 NOTE — DISCHARGE NOTE NURSING/CASE MANAGEMENT/SOCIAL WORK - NSDCFUADDAPPT_GEN_ALL_CORE_FT
- Continue BP meds as prescribed (hold is BP is under 110/60 or HR is under 60)  -Take blood pressure with at home cuff prior to taking medications; if BP is >150/90 call MD  - Return to hospital with headaches, visual changes, abdominal pain, nausea, vomiting, chest pain or shortness of breathe  - Follow up with OB in 2 days for BP check  - Follow up with [facility] (call [phone])

## 2020-06-18 NOTE — PROGRESS NOTE ADULT - PROBLEM SELECTOR PLAN 1
Neuro: No pain. Tylenol prn for headache.  CV: Hemodynamically stable. S/p Mag infusion. BPs well controlled on labetalol 200 BID. F/u AM HELLP labs.  Pulm: Saturating well on room air, encourage oob/amb.  GI: Continue regular diet.  : Voiding spontaneously, UOP adequate. Strict Is/Os.  Heme: SCDs and ambulation for DVT ppx.  FEN: SLIV.  ID: Afebrile.  Endo: No active issues.   Dispo: Anticipate d/c home today.    Jesenia Lamb PGY-3

## 2020-06-18 NOTE — PROGRESS NOTE ADULT - REASON FOR ADMISSION
PP Hypertension   PP Preeclampsia

## 2020-06-18 NOTE — DISCHARGE NOTE NURSING/CASE MANAGEMENT/SOCIAL WORK - PATIENT PORTAL LINK FT
You can access the FollowMyHealth Patient Portal offered by [facility] by registering at the following website: [email or web address]. By joining [email or web address]’s FollowMyHealth portal, you will also be able to view your health information using other applications (apps) compatible with our system.

## 2020-06-18 NOTE — PROGRESS NOTE ADULT - ASSESSMENT
A/P: 34y POD#21, HD#3, s/p rC/S, admitted for postpartum sPEC s/p Mag for seizure prophylaxis. BPs well controlled on medication.    -

## 2020-09-06 NOTE — PROGRESS NOTE ADULT - SUBJECTIVE AND OBJECTIVE BOX
CORETTA VERAS Progress Note: HD#2    Patient seen and examined at bedside.  No acute events overnight. No acute complaints.  Denies headache, vision changes, epigastric/RUQ pain. Patient is ambulating and tolerating PO. Patient is voiding spontaneously. Denies CP, SOB, N/V, fevers, and chills.      Vital Signs Last 24 Hours  T(C): 36.4 (06-17-20 @ 06:37), Max: 36.8 (06-16-20 @ 19:44)  HR: 71 (06-17-20 @ 06:37) (67 - 86)  BP: 134/88 (06-17-20 @ 06:37) (119/83 - 151/98)  RR: 16 (06-17-20 @ 06:37) (11 - 16)  SpO2: 99% (06-17-20 @ 06:37) (95% - 100%)    I&O's Summary    16 Jun 2020 07:01  -  17 Jun 2020 07:00  --------------------------------------------------------  IN: 1200 mL / OUT: 2100 mL / NET: -900 mL      Physical Exam:  General: NAD  Abdomen: Soft, NT, fundus below umbilicus and firm  Incision: healing well  : lochia wnl  Ext: No LE edema/tenderness      Labs:                        9.0    6.30  )-----------( 519      ( 17 Jun 2020 00:30 )             29.6   baso x      eos x      imm gran x      lymph x      mono x      poly x                            9.4    6.36  )-----------( 568      ( 16 Jun 2020 16:36 )             30.1   baso 0.6    eos 4.9    imm gran 0.3    lymph 37.7   mono 6.4    poly 50.1       MEDICATIONS  (STANDING):  labetalol 200 milliGRAM(s) Oral two times a day  lactated ringers. 1000 milliLiter(s) (50 mL/Hr) IV Continuous <Continuous>  magnesium sulfate Infusion 2 Gm/Hr (50 mL/Hr) IV Continuous <Continuous>    MEDICATIONS  (PRN):
CORETTA VERAS Progress Note: POD#21  HD#3    Patient seen and examined at bedside.  No acute events overnight. No acute complaints.  Denies headache, vision changes, epigastric/RUQ pain. Patient is ambulating and tolerating PO. Patient is voiding spontaneously. Denies CP, SOB, N/V, fevers, and chills.      Vital Signs Last 24 Hours  T(C): 36.7 (06-18-20 @ 09:55), Max: 36.9 (06-18-20 @ 05:05)  HR: 78 (06-18-20 @ 09:55) (70 - 80)  BP: 125/80 (06-18-20 @ 09:55) (125/80 - 140/88)  RR: 18 (06-18-20 @ 09:55) (16 - 18)  SpO2: 99% (06-18-20 @ 09:55) (99% - 100%)    I&O's Summary    17 Jun 2020 07:01  -  18 Jun 2020 07:00  --------------------------------------------------------  IN: 0 mL / OUT: 1750 mL / NET: -1750 mL        Physical Exam:  General: NAD  Abdomen: Soft, NT, fundus below umbilicus and firm  Incision: healing well  : lochia wnl  Ext: No LE edema/tenderness      Labs:                        9.9    6.06  )-----------( 525      ( 18 Jun 2020 11:22 )             31.1   baso 0.3    eos 5.1    imm gran 0.3    lymph 33.7   mono 5.4    poly 55.2                         9.6    4.86  )-----------( 526      ( 17 Jun 2020 07:08 )             30.5   baso 0.6    eos 5.3    imm gran 0.2    lymph 38.3   mono 6.6    poly 49.0                         9.0    6.30  )-----------( 519      ( 17 Jun 2020 00:30 )             29.6   baso x      eos x      imm gran x      lymph x      mono x      poly x                            9.4    6.36  )-----------( 568      ( 16 Jun 2020 16:36 )             30.1   baso 0.6    eos 4.9    imm gran 0.3    lymph 37.7   mono 6.4    poly 50.1       MEDICATIONS  (STANDING):  acetaminophen   Tablet .. 975 milliGRAM(s) Oral every 6 hours  labetalol 200 milliGRAM(s) Oral two times a day    MEDICATIONS  (PRN):
WAS ADMITTED LAST NIGHT  WAS SENT FROM OFFICE DUE TO HIGH BP  BP HAS STABILIZED WITH MGSO4 AND ALSO LABETALOL 200MG BID  MGSO4 TO BE STOPPED TONIGHT AFTER 24 HOURS  WILL REVIEW BP LEVELS AND IF STABLE WILL PROBABLY DC HOME TOMORROW  ALL QUESTIONS AND CONCERNS DISCUSSED  BABY DOING WELL
Home

## 2021-11-10 ENCOUNTER — RESULT REVIEW (OUTPATIENT)
Age: 36
End: 2021-11-10

## 2022-06-11 NOTE — OB PROVIDER H&P - NS_FETALPRESSONO_OBGYN_ALL_OB
What Type Of Note Output Would You Prefer (Optional)?: Bullet Format How Severe Is Your Skin Lesion?: moderate Has Your Skin Lesion Been Treated?: not been treated Is This A New Presentation, Or A Follow-Up?: Skin Lesions Which Family Member (Optional)?: Father No Cephalic

## 2022-10-27 NOTE — OB PST NOTE - FALL HARM RISK CONCLUSION
[Cardiac Failure] : cardiac failure [Coronary Artery Disease] : coronary artery disease Universal Safety Interventions

## 2023-06-01 NOTE — OB PROVIDER H&P - NSCORESITESY/N_GEN_A_CORE_RD
office called. Patient will be having a INTERSTIM surgery on 07/06/23.     They are needing surgical clearance.She is needing to stop her aspirin  7 days prior to surgery.     Fax number [phone] or they can be reached at  [phone]   No

## 2024-11-07 NOTE — PROVIDER CONTACT NOTE (CRITICAL VALUE NOTIFICATION) - SITUATION
POST-OPERATIVE INSTRUCTIONS FOR   CATARACT SURGERY    Keep your eye shield covering your eye at all times; only remove it to use your postop eye drops as scheduled 2 more times today and once tomorrow morning before your postop appointment at [address].  Place a tiny amount of the ointment in the corner of your eye at bedtime and it will melt and spread in the eye.  Always replace the shield over the eye immediately after placing medication.    Never touch or rub the operative eye.  The cataract wounds are sealed without any stitches, and if you push on the eye you can open up the wounds and allow germs to enter the eye.    You will need a  to take you home from surgery.  You should have a  available on the day after surgery in case you don't feel comfortable driving yourself to our office.    You may return to all regular activity. Avoid swimming, rigorous or high-impact exercise, eye makeup, and hot tubs for one week.    Call you doctor for problems of:  A. Increased pain (if greater than that experienced on the day of surgery).  B. Abnormal discharge from the eye.  C. Change in vision (worse than the vision the day after surgery).  D. Increased redness (worse than the day after surgery).    The vision in your operated eye may be poor until it heals completely. This may take up to six to eight weeks. Use caution on stairways or when walking. Do not drive until you are comfortable doing so.     A scratchy feeling in the corner of the eye or a foreign body sensation is not uncommon for the first week or two. You may use Tylenol for eye discomfort.     35 yo Postpartum readmit on mag. started 6/16 @ 0379.